# Patient Record
Sex: FEMALE | Race: WHITE | NOT HISPANIC OR LATINO | Employment: OTHER | URBAN - METROPOLITAN AREA
[De-identification: names, ages, dates, MRNs, and addresses within clinical notes are randomized per-mention and may not be internally consistent; named-entity substitution may affect disease eponyms.]

---

## 2023-06-08 ENCOUNTER — TELEPHONE (OUTPATIENT)
Dept: PRIMARY CARE | Facility: CLINIC | Age: 68
End: 2023-06-08
Payer: MEDICARE

## 2023-06-08 NOTE — TELEPHONE ENCOUNTER
Patient has a sinus infection and would like something called into her pharmacy [facility] in [place].

## 2023-07-31 ENCOUNTER — OFFICE VISIT (OUTPATIENT)
Dept: PRIMARY CARE | Facility: CLINIC | Age: 68
End: 2023-07-31
Payer: MEDICARE

## 2023-07-31 VITALS
DIASTOLIC BLOOD PRESSURE: 78 MMHG | HEART RATE: 96 BPM | WEIGHT: 176 LBS | BODY MASS INDEX: 28.41 KG/M2 | TEMPERATURE: 96.9 F | SYSTOLIC BLOOD PRESSURE: 134 MMHG

## 2023-07-31 DIAGNOSIS — E66.3 OVERWEIGHT WITH BODY MASS INDEX (BMI) OF 28 TO 28.9 IN ADULT: ICD-10-CM

## 2023-07-31 DIAGNOSIS — M79.18 PIRIFORMIS MUSCLE PAIN: Primary | ICD-10-CM

## 2023-07-31 DIAGNOSIS — M54.41 ACUTE RIGHT-SIDED LOW BACK PAIN WITH RIGHT-SIDED SCIATICA: ICD-10-CM

## 2023-07-31 PROBLEM — M54.50 LUMBAGO: Status: ACTIVE | Noted: 2023-07-31

## 2023-07-31 PROBLEM — E78.2 MIXED HYPERLIPIDEMIA: Status: ACTIVE | Noted: 2023-07-31

## 2023-07-31 PROBLEM — F41.1 GAD (GENERALIZED ANXIETY DISORDER): Status: ACTIVE | Noted: 2023-07-31

## 2023-07-31 PROBLEM — I10 HYPERTENSION: Status: ACTIVE | Noted: 2023-07-31

## 2023-07-31 PROCEDURE — 99213 OFFICE O/P EST LOW 20 MIN: CPT | Performed by: FAMILY MEDICINE

## 2023-07-31 PROCEDURE — 3008F BODY MASS INDEX DOCD: CPT | Performed by: FAMILY MEDICINE

## 2023-07-31 PROCEDURE — 1159F MED LIST DOCD IN RCRD: CPT | Performed by: FAMILY MEDICINE

## 2023-07-31 PROCEDURE — 3075F SYST BP GE 130 - 139MM HG: CPT | Performed by: FAMILY MEDICINE

## 2023-07-31 PROCEDURE — 1160F RVW MEDS BY RX/DR IN RCRD: CPT | Performed by: FAMILY MEDICINE

## 2023-07-31 PROCEDURE — 1036F TOBACCO NON-USER: CPT | Performed by: FAMILY MEDICINE

## 2023-07-31 PROCEDURE — 3078F DIAST BP <80 MM HG: CPT | Performed by: FAMILY MEDICINE

## 2023-07-31 RX ORDER — METHOCARBAMOL 500 MG/1
500 TABLET, FILM COATED ORAL 3 TIMES DAILY
COMMUNITY
Start: 2021-09-07 | End: 2023-12-19 | Stop reason: SDUPTHER

## 2023-07-31 RX ORDER — PAROXETINE 10 MG/1
10 TABLET, FILM COATED ORAL EVERY MORNING
COMMUNITY
Start: 2020-02-11 | End: 2023-11-26

## 2023-07-31 RX ORDER — NAPROXEN 500 MG/1
500 TABLET ORAL 2 TIMES DAILY PRN
Qty: 60 TABLET | Refills: 0 | Status: SHIPPED | OUTPATIENT
Start: 2023-07-31 | End: 2023-10-29

## 2023-07-31 RX ORDER — METHYLPREDNISOLONE 4 MG/1
TABLET ORAL
Qty: 21 TABLET | Refills: 0 | Status: SHIPPED | OUTPATIENT
Start: 2023-07-31 | End: 2023-08-07

## 2023-07-31 RX ORDER — ATORVASTATIN CALCIUM 20 MG/1
20 TABLET, FILM COATED ORAL
COMMUNITY
Start: 2020-11-30 | End: 2023-11-26

## 2023-07-31 RX ORDER — AMLODIPINE AND BENAZEPRIL HYDROCHLORIDE 5; 10 MG/1; MG/1
1 CAPSULE ORAL DAILY
COMMUNITY
Start: 2020-11-20 | End: 2023-11-26

## 2023-07-31 ASSESSMENT — PATIENT HEALTH QUESTIONNAIRE - PHQ9
1. LITTLE INTEREST OR PLEASURE IN DOING THINGS: NOT AT ALL
SUM OF ALL RESPONSES TO PHQ9 QUESTIONS 1 AND 2: 0
2. FEELING DOWN, DEPRESSED OR HOPELESS: NOT AT ALL

## 2023-07-31 NOTE — PROGRESS NOTES
Subjective   Patient ID: Bobbi Oliveira is a 67 y.o. female who presents for Back Pain (Pt is having sciatica pain on the right  lower side that radiates down to knee.for the last 3 weeks).  Very pleasant 67-year-old with back pain in the lower back radiating down to the leg especially painful in the hip  No injury or fall  Progressing over 3 weeks with no improvement  Waking her up at night  No incontinence no loss of feeling in the lower extremities    Back Pain  This is a new problem. The current episode started 1 to 4 weeks ago. The problem occurs daily. The problem has been gradually worsening since onset. The pain is present in the gluteal and sacro-iliac. The quality of the pain is described as aching, stabbing and shooting. The pain radiates to the right knee. The pain is moderate. The pain is Worse during the day. The symptoms are aggravated by bending, sitting and twisting. Stiffness is present In the morning. Pertinent negatives include no abdominal pain, bladder incontinence, bowel incontinence, chest pain, dysuria, fever, headaches, leg pain, numbness, paresis, paresthesias, pelvic pain, perianal numbness, tingling, weakness or weight loss. Risk factors include menopause. She has tried analgesics for the symptoms. The treatment provided no relief.       Review of Systems   Constitutional:  Negative for fever and weight loss.   Cardiovascular:  Negative for chest pain.   Gastrointestinal:  Negative for abdominal pain and bowel incontinence.   Genitourinary:  Negative for bladder incontinence, dysuria and pelvic pain.   Musculoskeletal:  Positive for back pain.   Neurological:  Negative for tingling, weakness, numbness, headaches and paresthesias.     Constitutional: no chills, no fever and no night sweats.   Eyes: no blurred vision and no eyesight problems.   ENT: no hearing loss, no nasal congestion, no nasal discharge, no hoarseness and no sore throat.   Cardiovascular: no chest pain, no  intermittent leg claudication, no lower extremity edema, no palpitations and no syncope.   Respiratory: no cough, no shortness of breath during exertion, no shortness of breath at rest and no wheezing.   Gastrointestinal: no abdominal pain, no blood in stools, no constipation, no diarrhea, no melena, no nausea, no rectal pain and no vomiting.   Genitourinary: no dysuria, no change in urinary frequency, no urinary hesitancy, no feelings of urinary urgency and no vaginal discharge.   Musculoskeletal: no arthralgias,  no back pain and no myalgias.   Integumentary: no new skin lesions and no rashes.   Neurological: no difficulty walking, no headache, no limb weakness, no numbness and no tingling.   Psychiatric: no anxiety, no depression, no anhedonia and no substance use disorders.   Endocrine: no recent weight gain and no recent weight loss.   Hematologic/Lymphatic: no tendency for easy bruising and no swollen glands .    Objective    /78   Pulse 96   Temp 36.1 °C (96.9 °F)   Wt 79.8 kg (176 lb)   BMI 28.41 kg/m²    Physical Exam  The patient appeared well nourished and normally developed. Vital signs as documented. Head exam is unremarkable. No scleral icterus or corneal arcus noted.  Pupils are equal round reactive to light extraocular movements are intact no hemorrhages noted on funduscopic exam mouth mucous membranes are moist no exudates ears canals clear TMs are gray pearly not injected nose no rhinorrhea or epistaxis Neck is without jugular venous distension, thyromegaly, or carotid bruits. Carotid upstrokes are brisk bilaterally. Lungs are clear to auscultation and percussion. Cardiac exam reveals the PMI to be normally sized and situated. Rhythm is regular. First and second heart sounds normal. No murmurs, rubs or gallops. Abdominal exam reveals normal bowel sounds, no masses, no organomegaly and no aortic enlargement. Extremities are nonedematous and both femoral and pedal pulses are normal.   Neurologic exam DTRs are equal bilaterally no focal deficits strength is symmetrical heme lymph no palpable lymph nodes in the neck axilla or groin pain in sciatica notch strength symmetrical     Assessment/Plan   Problem List Items Addressed This Visit       Lumbago     Physical therapy   Begin medrol dose pack         Piriformis muscle pain - Primary     Xray today  Begin physical therapy   Followup if no improvement         Relevant Medications    naproxen (Naprosyn) 500 mg tablet    Other Relevant Orders    XR hip right 2 or 3 views    Overweight with body mass index (BMI) of 28 to 28.9 in adult     Normal BMI nutrition and physical activity reviewed                 Corry Turk MD

## 2023-08-21 PROBLEM — H69.92 DYSFUNCTION OF LEFT EUSTACHIAN TUBE: Status: RESOLVED | Noted: 2023-08-21 | Resolved: 2023-08-21

## 2023-08-21 PROBLEM — R73.03 PREDIABETES: Status: ACTIVE | Noted: 2023-08-21

## 2023-08-21 PROBLEM — F40.243 ANXIETY WITH FLYING: Status: ACTIVE | Noted: 2023-08-21

## 2023-08-21 PROBLEM — E66.3 OVERWEIGHT WITH BODY MASS INDEX (BMI) OF 28 TO 28.9 IN ADULT: Status: ACTIVE | Noted: 2023-08-21

## 2023-08-21 PROBLEM — M79.18 PIRIFORMIS MUSCLE PAIN: Status: ACTIVE | Noted: 2023-08-21

## 2023-08-21 PROBLEM — R19.5 POSITIVE COLORECTAL CANCER SCREENING USING COLOGUARD TEST: Status: ACTIVE | Noted: 2023-08-21

## 2023-08-21 PROBLEM — G47.09 SECONDARY INSOMNIA: Status: ACTIVE | Noted: 2023-08-21

## 2023-08-21 PROBLEM — E55.9 VITAMIN D DEFICIENCY: Status: ACTIVE | Noted: 2023-08-21

## 2023-08-21 ASSESSMENT — ENCOUNTER SYMPTOMS
HEADACHES: 0
FEVER: 0
PARESTHESIAS: 0
TINGLING: 0
WEIGHT LOSS: 0
DYSURIA: 0
BOWEL INCONTINENCE: 0
PERIANAL NUMBNESS: 0
ABDOMINAL PAIN: 0
BACK PAIN: 1
WEAKNESS: 0
PARESIS: 0
LEG PAIN: 0
NUMBNESS: 0

## 2023-08-21 NOTE — PATIENT INSTRUCTIONS
Pain could be sciatica or piriformis syndrome, or due to hip arthritis  Xray today to evaluate and I recommend physical therapy to see if sympotms improve  Hopefully the anti-inflammatory medication and conservative physical therapy will help you improve  If you do not improve I will have you see a specialist to see if anything further can be done  Follow-up in a few weeks if no improvement  X-ray today  Continue healthy diet and exercise  Remember to schedule annual wellness exam for health maintenance

## 2023-09-11 ENCOUNTER — TELEPHONE (OUTPATIENT)
Dept: PRIMARY CARE | Facility: CLINIC | Age: 68
End: 2023-09-11
Payer: MEDICARE

## 2023-09-15 ENCOUNTER — TELEPHONE (OUTPATIENT)
Dept: PRIMARY CARE | Facility: CLINIC | Age: 68
End: 2023-09-15
Payer: MEDICARE

## 2023-09-15 NOTE — TELEPHONE ENCOUNTER
[facility] physical therapy is waiting for Dr. Turk to sign paper work that was sent electronically and faxed over.

## 2023-09-17 DIAGNOSIS — M79.18 PIRIFORMIS MUSCLE PAIN: Primary | ICD-10-CM

## 2023-09-17 DIAGNOSIS — M19.91 PRIMARY OSTEOARTHRITIS, UNSPECIFIED SITE: ICD-10-CM

## 2023-09-17 RX ORDER — DICLOFENAC SODIUM 75 MG/1
75 TABLET, DELAYED RELEASE ORAL 2 TIMES DAILY PRN
Qty: 180 TABLET | Refills: 3 | Status: SHIPPED | OUTPATIENT
Start: 2023-09-17 | End: 2024-09-16

## 2023-09-17 NOTE — PROGRESS NOTES
Patient sent message asking for medication for hip arthritis  Sent diclofenac  Consider hip fluoroscopy injection steroid if no improvement

## 2023-10-10 ENCOUNTER — OFFICE VISIT (OUTPATIENT)
Dept: ORTHOPEDIC SURGERY | Facility: CLINIC | Age: 68
End: 2023-10-10
Payer: MEDICARE

## 2023-10-10 VITALS — BODY MASS INDEX: 26.66 KG/M2 | WEIGHT: 160 LBS | HEIGHT: 65 IN

## 2023-10-10 DIAGNOSIS — M16.11 ARTHRITIS OF RIGHT HIP: Primary | ICD-10-CM

## 2023-10-10 DIAGNOSIS — M25.551 RIGHT HIP PAIN: ICD-10-CM

## 2023-10-10 PROCEDURE — 1160F RVW MEDS BY RX/DR IN RCRD: CPT | Performed by: FAMILY MEDICINE

## 2023-10-10 PROCEDURE — 99203 OFFICE O/P NEW LOW 30 MIN: CPT | Performed by: FAMILY MEDICINE

## 2023-10-10 PROCEDURE — 1159F MED LIST DOCD IN RCRD: CPT | Performed by: FAMILY MEDICINE

## 2023-10-10 PROCEDURE — 3008F BODY MASS INDEX DOCD: CPT | Performed by: FAMILY MEDICINE

## 2023-10-10 PROCEDURE — 20611 DRAIN/INJ JOINT/BURSA W/US: CPT | Performed by: FAMILY MEDICINE

## 2023-10-10 PROCEDURE — 1036F TOBACCO NON-USER: CPT | Performed by: FAMILY MEDICINE

## 2023-10-10 RX ORDER — LIDOCAINE HYDROCHLORIDE 10 MG/ML
2 INJECTION INFILTRATION; PERINEURAL
Status: COMPLETED | OUTPATIENT
Start: 2023-10-10 | End: 2023-10-10

## 2023-10-10 RX ORDER — TRIAMCINOLONE ACETONIDE 40 MG/ML
40 INJECTION, SUSPENSION INTRA-ARTICULAR; INTRAMUSCULAR
Status: COMPLETED | OUTPATIENT
Start: 2023-10-10 | End: 2023-10-10

## 2023-10-10 RX ADMIN — LIDOCAINE HYDROCHLORIDE 2 ML: 10 INJECTION INFILTRATION; PERINEURAL at 10:10

## 2023-10-10 RX ADMIN — TRIAMCINOLONE ACETONIDE 40 MG: 40 INJECTION, SUSPENSION INTRA-ARTICULAR; INTRAMUSCULAR at 10:10

## 2023-10-10 NOTE — PROGRESS NOTES
History of Present Illness   Chief Complaint   Patient presents with    Right Hip - Pain       The patient is 67 y.o. female  here with a complaint of right hip pain.  Onset of symptoms over the past 3 months, atraumatic in nature.  Patient did see her primary care physician near onset of symptoms, she had x-rays of her hip which showed some mild to moderate degenerative changes, there was some concern for potential sciatic component to her symptoms as she was having some radiating pain down her leg to her knee.  She was started on some prednisone and referred to physical therapy.  She has seen some improvement in symptoms overall but is still having discomfort in her hip most notable with prolonged standing, walking.  She points to the anterior aspect of her hip in her groin as area of discomfort, she also admits to some stabbing pain in the posterior hip, still with some radiation of pain down her leg but less prominent than before.  She has been doing physical therapy through [facility] with some improvement, she was given a heel lift for left as there was thought to be a mild leg length discrepancy.  Patient did see an orthopedic surgeon through [facility], was told that her arthritis was mild and that she was not a candidate for surgery, she is looking for alternative opinion on neck steps in treatment.  She denies any significant back pain, she denies any lower extremity numbness or tingling.  She has tried prescription naproxen and diclofenac from her PCP which does seem to help her pain but says that it does not make her feel all that good including some GI sensitivity.    Past Medical History:   Diagnosis Date    Acute frontal sinusitis, unspecified 05/22/2020    Sinusitis, acute frontal    Acute maxillary sinusitis, unspecified 05/22/2020    Sinusitis, acute maxillary    Dysfunction of left eustachian tube 08/21/2023    Generalized anxiety disorder 01/10/2020    Anxiety, generalized    Nervousness  01/10/2020    Nervously anxious    Personal history of other endocrine, nutritional and metabolic disease 05/03/2021    History of hyperlipidemia    Personal history of other infectious and parasitic diseases 02/09/2018    History of herpes zoster    Personal history of other medical treatment 04/13/2021    History of screening mammography       Medication Documentation Review Audit       Reviewed by Corry Turk MD (Physician) on 08/21/23 at 0025      Medication Order Taking? Sig Documenting Provider Last Dose Status   amLODIPine-benazepriL (Lotrel) 5-10 mg capsule [id number] Yes Take 1 capsule by mouth once daily. Historical Provider, MD  Active   atorvastatin (Lipitor) 20 mg tablet [id number] Yes 1 tablet (20 mg). Historical Provider, MD  Active   methocarbamol (Robaxin) 500 mg tablet [id number] Yes Take 1 tablet (500 mg) by mouth 3 times a day. Historical Provider, MD  Active   naproxen (Naprosyn) 500 mg tablet [id number]  Take 1 tablet (500 mg) by mouth 2 times a day as needed for mild pain (1 - 3) (pain). Corry Turk MD  Active   PARoxetine (Paxil) 10 mg tablet [id number] Yes Take 1 tablet (10 mg) by mouth once daily in the morning. Historical Provider, MD  Active                    No Known Allergies    Social History     Socioeconomic History    Marital status: Unknown     Spouse name: Not on file    Number of children: Not on file    Years of education: Not on file    Highest education level: Not on file   Occupational History    Not on file   Tobacco Use    Smoking status: Never    Smokeless tobacco: Never   Substance and Sexual Activity    Alcohol use: Yes    Drug use: Never    Sexual activity: Not on file   Other Topics Concern    Not on file   Social History Narrative    Not on file     Social Determinants of Health     Financial Resource Strain: Not on file   Food Insecurity: Not on file   Transportation Needs: Not on file   Physical Activity: Not on file   Stress: Not on file   Social Connections: Not  on file   Intimate Partner Violence: Not on file   Housing Stability: Not on file       Past Surgical History:   Procedure Laterality Date    DILATION AND CURETTAGE OF UTERUS  02/09/2018    Dilation And Curettage    OTHER SURGICAL HISTORY  11/20/2020    Dilation and curettage          Review of Systems   GENERAL: Negative  GI: Negative  MUSCULOSKELETAL: See HPI  SKIN: Negative  NEURO:  Negative     Physical Exam:    General/Constitutional: well appearing, no distress, appears stated age  HEENT: sclera clear  Respiratory: non labored breathing  Vascular: No edema, swelling or tenderness, except as noted in detailed exam.  Integumentary: No impressive skin lesions present, except as noted in detailed exam.  Neurological:  Alert and oriented   Psychological:  Normal mood and affect.  Musculoskeletal: Normal, except as noted in detailed exam and in HPI.  Normal gait, unassisted    Right hip: Normal appearance, no significant length discrepancy rotational deformity.  There are some mild tenderness palpation at the SI joint posteriorly, no anterior or lateral hip tenderness palpation.  Range of motion with flexion to 100 degrees with pain, internal rotation 20 degrees with exacerbation of anterior hip/groin pain.  No significant motor deficits are present at the hip.  She does have a positive Stinchfield test.    Lumbar spine exam reveals right-sided SI joint tenderness, no lower lumbar paraspinal or midline tenderness.  She has good range of motion with flexion, extension, bilateral twisting and side bending without pain, no lower extremity numbness or weakness, no pathologic lower extremity reflexes bilaterally.      L Inj/Asp: R hip joint on 10/10/2023 10:10 AM  Indications: pain  Details: 22 G needle, ultrasound-guided anterior approach  Medications: 40 mg triamcinolone acetonide 40 mg/mL; 2 mL lidocaine 10 mg/mL (1 %)  Outcome: tolerated well, no immediate complications  Procedure, treatment alternatives, risks and  benefits explained, specific risks discussed. Consent was given by the patient. Immediately prior to procedure a time out was called to verify the correct patient, procedure, equipment, support staff and site/side marked as required. Patient was prepped and draped in the usual sterile fashion.                Imaging  X-rays of right hip from 8/8/2023 independently reviewed, there is some mild to moderate degenerative changes of the right hip, there is mild joint space narrowing, there is osteophytosis at the superior aspect of the acetabulum     Assessment   1. Arthritis of right hip        2. Right hip pain  Point of Care Ultrasound    L Inj/Asp: R hip joint        Right hip pain, history, exam findings do suggest intra-articular component to her symptoms, x-rays show some mild to moderate degenerative changes.  Ongoing symptoms despite conservative management including prescription NSAIDs and physical therapy    Plan  Discussed diagnosis, reviewed x-rays, further work-up and treatment.  We did opt to proceed with ultrasound-guided right hip joint injection with Kenalog for both diagnostic and therapeutic purposes.  She will continue with physical therapy, hopefully she can wean off of prescription NSAIDs pending response to injection.  I would like to see her back in 4 weeks for reassessment.

## 2023-10-13 ENCOUNTER — HOSPITAL ENCOUNTER (OUTPATIENT)
Dept: RADIOLOGY | Facility: EXTERNAL LOCATION | Age: 68
Discharge: HOME | End: 2023-10-13
Payer: MEDICARE

## 2023-10-17 ENCOUNTER — APPOINTMENT (OUTPATIENT)
Dept: ORTHOPEDIC SURGERY | Facility: CLINIC | Age: 68
End: 2023-10-17
Payer: MEDICARE

## 2023-11-06 PROBLEM — E78.1 HYPERTRIGLYCERIDEMIA: Status: ACTIVE | Noted: 2023-11-06

## 2023-11-06 PROBLEM — N63.0 MASS OF BREAST: Status: ACTIVE | Noted: 2023-11-06

## 2023-11-06 PROBLEM — D72.829 LEUKOCYTOSIS: Status: ACTIVE | Noted: 2023-11-06

## 2023-11-06 PROBLEM — N95.2 VAGINAL ATROPHY: Status: ACTIVE | Noted: 2023-11-06

## 2023-11-06 RX ORDER — HYDROXYZINE HYDROCHLORIDE 25 MG/1
1 TABLET, FILM COATED ORAL DAILY PRN
COMMUNITY
End: 2023-12-19 | Stop reason: SDUPTHER

## 2023-11-06 RX ORDER — FENOFIBRATE 145 MG/1
145 TABLET, FILM COATED ORAL DAILY
COMMUNITY

## 2023-11-06 RX ORDER — ALPRAZOLAM 0.5 MG/1
0.5 TABLET ORAL DAILY PRN
COMMUNITY

## 2023-11-07 ENCOUNTER — OFFICE VISIT (OUTPATIENT)
Dept: ORTHOPEDIC SURGERY | Facility: CLINIC | Age: 68
End: 2023-11-07
Payer: MEDICARE

## 2023-11-07 VITALS — BODY MASS INDEX: 26.66 KG/M2 | WEIGHT: 160 LBS | HEIGHT: 65 IN

## 2023-11-07 DIAGNOSIS — M16.11 ARTHRITIS OF RIGHT HIP: Primary | ICD-10-CM

## 2023-11-07 DIAGNOSIS — Z86.59 HISTORY OF CLAUSTROPHOBIA: ICD-10-CM

## 2023-11-07 DIAGNOSIS — M25.551 PAIN OF RIGHT HIP: ICD-10-CM

## 2023-11-07 PROCEDURE — 1159F MED LIST DOCD IN RCRD: CPT | Performed by: FAMILY MEDICINE

## 2023-11-07 PROCEDURE — 3078F DIAST BP <80 MM HG: CPT | Performed by: FAMILY MEDICINE

## 2023-11-07 PROCEDURE — 3008F BODY MASS INDEX DOCD: CPT | Performed by: FAMILY MEDICINE

## 2023-11-07 PROCEDURE — 99214 OFFICE O/P EST MOD 30 MIN: CPT | Performed by: FAMILY MEDICINE

## 2023-11-07 PROCEDURE — 1160F RVW MEDS BY RX/DR IN RCRD: CPT | Performed by: FAMILY MEDICINE

## 2023-11-07 PROCEDURE — 1036F TOBACCO NON-USER: CPT | Performed by: FAMILY MEDICINE

## 2023-11-07 PROCEDURE — 3075F SYST BP GE 130 - 139MM HG: CPT | Performed by: FAMILY MEDICINE

## 2023-11-07 RX ORDER — LORAZEPAM 1 MG/1
1 TABLET ORAL SEE ADMIN INSTRUCTIONS
Qty: 1 TABLET | Refills: 0 | Status: SHIPPED | OUTPATIENT
Start: 2023-11-07

## 2023-11-07 RX ORDER — MELOXICAM 15 MG/1
15 TABLET ORAL DAILY
Qty: 30 TABLET | Refills: 0 | Status: SHIPPED | OUTPATIENT
Start: 2023-11-07 | End: 2023-12-07

## 2023-11-07 NOTE — PROGRESS NOTES
History of Present Illness   Chief Complaint   Patient presents with    Right Hip - Follow-up       The patient is 67 y.o. female  here for follow-up of right hip pain.  Patient was seen by me 1 month ago, onset of symptoms approximately 4 months ago.  See previous note for full details.  In brief patient has been dealing with some atraumatic pain in her right hip including the anterior hip and groin with some radiation of pain down her leg to her knee.  Treatment prior to seeing me was conservative with some prescription anti-inflammatories including prednisone as well as diclofenac and formal physical therapy, minimal change in symptoms, she did have x-rays which showed some mild to moderate degenerative changes, she did see outside orthopedic surgeon who recommended against surgical intervention.  At our initial visit 1 month ago symptoms did seem to be coming from the hip joint, she was treated with ultrasound-guided hip joint injection with Kenalog.  She says that for around 10 days she had around 80% improvement in her symptoms but has had recurrence of pain.  She admits to ongoing discomfort in the anterior hip and groin, still having some pain down her thigh, slight more prominent laterally to her knee, occasional pain past her knee.  She has pain most notable with prolonged standing, walking      Past Medical History:   Diagnosis Date    Acute frontal sinusitis, unspecified 05/22/2020    Sinusitis, acute frontal    Acute maxillary sinusitis, unspecified 05/22/2020    Sinusitis, acute maxillary    Dysfunction of left eustachian tube 08/21/2023    Generalized anxiety disorder 01/10/2020    Anxiety, generalized    Nervousness 01/10/2020    Nervously anxious    Personal history of other endocrine, nutritional and metabolic disease 05/03/2021    History of hyperlipidemia    Personal history of other infectious and parasitic diseases 02/09/2018    History of herpes zoster    Personal history of other medical  treatment 04/13/2021    History of screening mammography       Medication Documentation Review Audit       Reviewed by Corry Turk MD (Physician) on 08/21/23 at 0025      Medication Order Taking? Sig Documenting Provider Last Dose Status   amLODIPine-benazepriL (Lotrel) 5-10 mg capsule [id number] Yes Take 1 capsule by mouth once daily. Historical Provider, MD  Active   atorvastatin (Lipitor) 20 mg tablet [id number] Yes 1 tablet (20 mg). Historical Provider, MD  Active   methocarbamol (Robaxin) 500 mg tablet [id number] Yes Take 1 tablet (500 mg) by mouth 3 times a day. Historical Provider, MD  Active   naproxen (Naprosyn) 500 mg tablet [id number]  Take 1 tablet (500 mg) by mouth 2 times a day as needed for mild pain (1 - 3) (pain). Corry Turk MD  Active   PARoxetine (Paxil) 10 mg tablet [id number] Yes Take 1 tablet (10 mg) by mouth once daily in the morning. Historical Provider, MD  Active                    Allergies   Allergen Reactions    Aspirin Agitation and Other     bruising    Increase bruising per pt    bruising    Increase bruising per pt       Social History     Socioeconomic History    Marital status: Unknown     Spouse name: Not on file    Number of children: Not on file    Years of education: Not on file    Highest education level: Not on file   Occupational History    Not on file   Tobacco Use    Smoking status: Never    Smokeless tobacco: Never   Substance and Sexual Activity    Alcohol use: Yes    Drug use: Never    Sexual activity: Not on file   Other Topics Concern    Not on file   Social History Narrative    Not on file     Social Determinants of Health     Financial Resource Strain: Not on file   Food Insecurity: Not on file   Transportation Needs: Not on file   Physical Activity: Not on file   Stress: Not on file   Social Connections: Not on file   Intimate Partner Violence: Not on file   Housing Stability: Not on file       Past Surgical History:   Procedure Laterality Date    DILATION AND  CURETTAGE OF UTERUS  02/09/2018    Dilation And Curettage    OTHER SURGICAL HISTORY  11/20/2020    Dilation and curettage          Review of Systems   GENERAL: Negative  GI: Negative  MUSCULOSKELETAL: See HPI  SKIN: Negative  NEURO:  Negative     Physical Exam:    General/Constitutional: well appearing, no distress, appears stated age  HEENT: sclera clear  Respiratory: non labored breathing  Vascular: No edema, swelling or tenderness, except as noted in detailed exam.  Integumentary: No impressive skin lesions present, except as noted in detailed exam.  Neurological:  Alert and oriented   Psychological:  Normal mood and affect.  Musculoskeletal: Normal, except as noted in detailed exam and in HPI.  Antalgic gait, unassisted    Right hip: Normal appearance, no significant length discrepancy rotational deformity.  There are some mild tenderness palpation at the SI joint posteriorly, no anterior or lateral hip tenderness palpation.  Range of motion with flexion to 100 degrees with pain, internal rotation 20 degrees with exacerbation of anterior hip/groin pain.  No significant motor deficits are present at the hip.  She does have a positive Stinchfield test.    Lumbar spine exam reveals right-sided SI joint tenderness, no lower lumbar paraspinal or midline tenderness.  She has good range of motion with flexion, extension, bilateral twisting and side bending without pain.  Negative straight leg raise test, negative slump test bilaterally, no lower extremity numbness or weakness, no pathologic lower extremity reflexes bilaterally.           Imaging  No new imaging today     Assessment   1. Pain of right hip  MR hip right wo IV contrast    meloxicam (Mobic) 15 mg tablet      2. History of claustrophobia  LORazepam (Ativan) 1 mg tablet        Right hip/lower extremity pain, based on exam findings, good but short-term response to intra-articular hip joint injection I do think majority of pain is likely stemming from  arthritis though x-rays do only show some more mild degenerative changes.  We discussed potential radicular component to her symptoms but seems less likely, does have some SI joint pain which may be compensatory from her hip joint    Plan  Discussed further work-up and treatment with patient.  I would like to obtain an MRI of her right hip for further evaluation of her hip joint given only or mild degenerative changes seen on x-ray for better evaluation of any additional arthritic changes seen in her hip, other potential causes of her hip pain.  We discussed possible evaluation by medical spine, could consider if minimal degenerative changes seen on x-ray, we also discussed role of possible PRP injection for hip joint as well as more definitive management with hip replacement surgery.  She was given a prescription for meloxicam to assist in pain control, she will stop all other NSAIDs.  Further treatment pending MRI results..

## 2023-11-18 DIAGNOSIS — I10 HYPERTENSION: Primary | ICD-10-CM

## 2023-11-26 RX ORDER — PAROXETINE 10 MG/1
10 TABLET, FILM COATED ORAL DAILY
Qty: 90 TABLET | Refills: 3 | Status: SHIPPED | OUTPATIENT
Start: 2023-11-26

## 2023-11-26 RX ORDER — ATORVASTATIN CALCIUM 20 MG/1
20 TABLET, FILM COATED ORAL NIGHTLY
Qty: 90 TABLET | Refills: 3 | Status: SHIPPED | OUTPATIENT
Start: 2023-11-26

## 2023-11-26 RX ORDER — AMLODIPINE AND BENAZEPRIL HYDROCHLORIDE 5; 10 MG/1; MG/1
1 CAPSULE ORAL DAILY
Qty: 90 CAPSULE | Refills: 3 | Status: SHIPPED | OUTPATIENT
Start: 2023-11-26

## 2023-11-27 ENCOUNTER — HOSPITAL ENCOUNTER (OUTPATIENT)
Dept: RADIOLOGY | Facility: CLINIC | Age: 68
Discharge: HOME | End: 2023-11-27
Payer: MEDICARE

## 2023-11-27 DIAGNOSIS — M25.551 PAIN OF RIGHT HIP: ICD-10-CM

## 2023-11-27 PROCEDURE — 73721 MRI JNT OF LWR EXTRE W/O DYE: CPT | Mod: RT

## 2023-11-27 PROCEDURE — 73721 MRI JNT OF LWR EXTRE W/O DYE: CPT | Mod: RIGHT SIDE | Performed by: RADIOLOGY

## 2023-11-27 PROCEDURE — 72195 MRI PELVIS W/O DYE: CPT | Mod: RIGHT SIDE | Performed by: RADIOLOGY

## 2023-12-19 ENCOUNTER — OFFICE VISIT (OUTPATIENT)
Dept: PRIMARY CARE | Facility: CLINIC | Age: 68
End: 2023-12-19
Payer: MEDICARE

## 2023-12-19 VITALS
HEIGHT: 65 IN | BODY MASS INDEX: 28.92 KG/M2 | DIASTOLIC BLOOD PRESSURE: 82 MMHG | TEMPERATURE: 99 F | SYSTOLIC BLOOD PRESSURE: 137 MMHG | HEART RATE: 100 BPM | WEIGHT: 173.6 LBS

## 2023-12-19 DIAGNOSIS — Z12.31 ENCOUNTER FOR SCREENING MAMMOGRAM FOR BREAST CANCER: ICD-10-CM

## 2023-12-19 DIAGNOSIS — F40.243 ANXIETY WITH FLYING: ICD-10-CM

## 2023-12-19 DIAGNOSIS — M15.9 PRIMARY OSTEOARTHRITIS INVOLVING MULTIPLE JOINTS: ICD-10-CM

## 2023-12-19 DIAGNOSIS — M54.41 ACUTE RIGHT-SIDED LOW BACK PAIN WITH RIGHT-SIDED SCIATICA: ICD-10-CM

## 2023-12-19 DIAGNOSIS — Z11.59 NEED FOR HEPATITIS C SCREENING TEST: ICD-10-CM

## 2023-12-19 DIAGNOSIS — R73.03 PREDIABETES: ICD-10-CM

## 2023-12-19 DIAGNOSIS — E78.2 MIXED HYPERLIPIDEMIA: ICD-10-CM

## 2023-12-19 DIAGNOSIS — E66.3 OVERWEIGHT WITH BODY MASS INDEX (BMI) OF 28 TO 28.9 IN ADULT: ICD-10-CM

## 2023-12-19 DIAGNOSIS — Z00.00 MEDICARE ANNUAL WELLNESS VISIT, SUBSEQUENT: Primary | ICD-10-CM

## 2023-12-19 DIAGNOSIS — Z78.0 POSTMENOPAUSAL STATE: ICD-10-CM

## 2023-12-19 PROBLEM — E78.1 HYPERTRIGLYCERIDEMIA: Status: RESOLVED | Noted: 2023-11-06 | Resolved: 2023-12-19

## 2023-12-19 PROCEDURE — 3008F BODY MASS INDEX DOCD: CPT | Performed by: FAMILY MEDICINE

## 2023-12-19 PROCEDURE — G0009 ADMIN PNEUMOCOCCAL VACCINE: HCPCS | Performed by: FAMILY MEDICINE

## 2023-12-19 PROCEDURE — 90677 PCV20 VACCINE IM: CPT | Performed by: FAMILY MEDICINE

## 2023-12-19 PROCEDURE — G0439 PPPS, SUBSEQ VISIT: HCPCS | Performed by: FAMILY MEDICINE

## 2023-12-19 PROCEDURE — 1036F TOBACCO NON-USER: CPT | Performed by: FAMILY MEDICINE

## 2023-12-19 PROCEDURE — 3075F SYST BP GE 130 - 139MM HG: CPT | Performed by: FAMILY MEDICINE

## 2023-12-19 PROCEDURE — 1170F FXNL STATUS ASSESSED: CPT | Performed by: FAMILY MEDICINE

## 2023-12-19 PROCEDURE — 99212 OFFICE O/P EST SF 10 MIN: CPT | Performed by: FAMILY MEDICINE

## 2023-12-19 PROCEDURE — 3079F DIAST BP 80-89 MM HG: CPT | Performed by: FAMILY MEDICINE

## 2023-12-19 PROCEDURE — 1159F MED LIST DOCD IN RCRD: CPT | Performed by: FAMILY MEDICINE

## 2023-12-19 RX ORDER — MELOXICAM 7.5 MG/1
7.5 TABLET ORAL DAILY
Qty: 30 TABLET | Refills: 11 | Status: SHIPPED | OUTPATIENT
Start: 2023-12-19 | End: 2024-12-18

## 2023-12-19 RX ORDER — HYDROXYZINE HYDROCHLORIDE 25 MG/1
25 TABLET, FILM COATED ORAL DAILY PRN
Qty: 90 TABLET | Refills: 3 | Status: SHIPPED | OUTPATIENT
Start: 2023-12-19

## 2023-12-19 RX ORDER — METHOCARBAMOL 500 MG/1
500 TABLET, FILM COATED ORAL 3 TIMES DAILY
Qty: 90 TABLET | Refills: 3 | Status: SHIPPED | OUTPATIENT
Start: 2023-12-19

## 2023-12-19 ASSESSMENT — PATIENT HEALTH QUESTIONNAIRE - PHQ9
SUM OF ALL RESPONSES TO PHQ9 QUESTIONS 1 AND 2: 0
2. FEELING DOWN, DEPRESSED OR HOPELESS: NOT AT ALL
1. LITTLE INTEREST OR PLEASURE IN DOING THINGS: NOT AT ALL

## 2023-12-19 ASSESSMENT — ACTIVITIES OF DAILY LIVING (ADL)
DRESSING: INDEPENDENT
DOING_HOUSEWORK: INDEPENDENT
GROCERY_SHOPPING: INDEPENDENT
TAKING_MEDICATION: INDEPENDENT
MANAGING_FINANCES: INDEPENDENT
BATHING: INDEPENDENT

## 2023-12-19 NOTE — PROGRESS NOTES
Subjective   Reason for Visit: Bobbi Oliveira is an 68 y.o. female here for a Medicare Wellness visit.     Past Medical, Surgical, and Family History reviewed and updated in chart.    Reviewed all medications by prescribing practitioner or clinical pharmacist (such as prescriptions, OTCs, herbal therapies and supplements) and documented in the medical record.    Very pleasant 68-year-old here for annual Medicare wellness exam  Low back pain and hip pain  Underwent physical therapy  Still having discomfort has had for several weeks  No incontinence  Has pain low back aggravated by sitting has been going on for several weeks no acute injury  Has history of situational anxiety would like refill of hydroxyzine works well with no side effects or issues  Also complaining of joint pain  Has age-related arthritis  Starting to interfere with function  Has been progressive over several months        Patient Self Assessment of Health Status  Patient Self Assessment: Good    Nutrition and Exercise  Current Diet: Well Balanced Diet  Adequate Fluid Intake: Yes  Caffeine: Yes  Exercise Frequency: Regularly    Functional Ability/Level of Safety  Cognitive Impairment Observed: No cognitive impairment observed    Home Safety Risk Factors: None         Patient Care Team:  Corry Turk MD as PCP - General  Corry Turk MD as PCP - [facility] Attributed Provider     Review of Systems  Constitutional: no chills, no fever and no night sweats.   Eyes: no blurred vision and no eyesight problems.   ENT: no hearing loss, no nasal congestion, no nasal discharge, no hoarseness and no sore throat.   Cardiovascular: no chest pain, no intermittent leg claudication, no lower extremity edema, no palpitations and no syncope.   Respiratory: no cough, no shortness of breath during exertion, no shortness of breath at rest and no wheezing.   Gastrointestinal: no abdominal pain, no blood in stools, no constipation, no diarrhea, no melena, no  "nausea, no rectal pain and no vomiting.   Genitourinary: no dysuria, no change in urinary frequency, no urinary hesitancy, no feelings of urinary urgency and no vaginal discharge.   Musculoskeletal: no arthralgias,  no back pain and no myalgias.   Integumentary: no new skin lesions and no rashes.   Neurological: no difficulty walking, no headache, no limb weakness, no numbness and no tingling.   Psychiatric: no anxiety, no depression, no anhedonia and no substance use disorders.   Endocrine: no recent weight gain and no recent weight loss.   Hematologic/Lymphatic: no tendency for easy bruising and no swollen glands .    Medicare Wellness Visit Billing Compliance Not Met    *This is a visual tool to show completion of required items on the day of the visit. Green checks will only appear on the date of visit.      Objective   Vitals:  /82   Pulse 100   Temp 37.2 °C (99 °F)   Ht 1.651 m (5' 5\")   Wt 78.7 kg (173 lb 9.6 oz)   BMI 28.89 kg/m²       Physical Exam  The patient appeared well nourished and normally developed. Vital signs as documented. Head exam is unremarkable. No scleral icterus or corneal arcus noted.  Pupils are equal round reactive to light extraocular movements are intact no hemorrhages noted on funduscopic exam mouth mucous membranes are moist no exudates ears canals clear TMs are gray pearly not injected nose no rhinorrhea or epistaxis Neck is without jugular venous distension, thyromegaly, or carotid bruits. Carotid upstrokes are brisk bilaterally. Lungs are clear to auscultation and percussion. Cardiac exam reveals the PMI to be normally sized and situated. Rhythm is regular. First and second heart sounds normal. No murmurs, rubs or gallops. Abdominal exam reveals normal bowel sounds, no masses, no organomegaly and no aortic enlargement. Extremities are nonedematous and both femoral and pedal pulses are normal.  Neurologic exam DTRs are equal bilaterally no focal deficits strength is " symmetrical heme lymph no palpable lymph nodes in the neck axilla or groin    Assessment/Plan   Problem List Items Addressed This Visit       Mixed hyperlipidemia    Lumbago    Current Assessment & Plan     Most likely due to degenerative arthritis  Robaxin and meloxicam  Regular physical exercise physical therapy  Follow-up if no improvement         Relevant Medications    methocarbamol (Robaxin) 500 mg tablet    Anxiety with flying    Current Assessment & Plan     Hydroxyzine as needed         Relevant Medications    hydrOXYzine HCL (Atarax) 25 mg tablet    Prediabetes    Relevant Orders    Hemoglobin A1C    Overweight with body mass index (BMI) of 28 to 28.9 in adult    Current Assessment & Plan     Above normal BMI nutrition and physical activity reviewed         Medicare annual wellness visit, subsequent - Primary    Current Assessment & Plan     Unremarkable physical exam  Continue annual exams         Primary osteoarthritis involving multiple joints    Current Assessment & Plan     Age-related degenerative arthritis  Meloxicam  Follow-up yearly or if no improvement  Regular physical exercise warm water exercise classes help         Relevant Medications    meloxicam (Mobic) 7.5 mg tablet     Other Visit Diagnoses       Need for hepatitis C screening test        Relevant Orders    Hepatitis C antibody    Postmenopausal state        Relevant Orders    XR DEXA bone density    Encounter for screening mammogram for breast cancer        Relevant Orders    BI mammo bilateral screening tomosynthesis

## 2024-01-01 PROBLEM — M15.9 PRIMARY OSTEOARTHRITIS INVOLVING MULTIPLE JOINTS: Status: ACTIVE | Noted: 2024-01-01

## 2024-01-01 PROBLEM — Z00.00 MEDICARE ANNUAL WELLNESS VISIT, SUBSEQUENT: Status: ACTIVE | Noted: 2024-01-01

## 2024-01-01 PROBLEM — M15.0 PRIMARY OSTEOARTHRITIS INVOLVING MULTIPLE JOINTS: Status: ACTIVE | Noted: 2024-01-01

## 2024-01-01 NOTE — ASSESSMENT & PLAN NOTE
Most likely due to degenerative arthritis  Robaxin and meloxicam  Regular physical exercise physical therapy  Follow-up if no improvement

## 2024-01-01 NOTE — ASSESSMENT & PLAN NOTE
Age-related degenerative arthritis  Meloxicam  Follow-up yearly or if no improvement  Regular physical exercise warm water exercise classes help

## 2024-01-11 ENCOUNTER — OFFICE VISIT (OUTPATIENT)
Dept: OTOLARYNGOLOGY | Facility: CLINIC | Age: 69
End: 2024-01-11
Payer: MEDICARE

## 2024-01-11 ENCOUNTER — CLINICAL SUPPORT (OUTPATIENT)
Dept: AUDIOLOGY | Facility: CLINIC | Age: 69
End: 2024-01-11
Payer: MEDICARE

## 2024-01-11 VITALS
TEMPERATURE: 98.8 F | SYSTOLIC BLOOD PRESSURE: 134 MMHG | HEIGHT: 65 IN | HEART RATE: 114 BPM | WEIGHT: 175 LBS | DIASTOLIC BLOOD PRESSURE: 86 MMHG | BODY MASS INDEX: 29.16 KG/M2

## 2024-01-11 DIAGNOSIS — H90.3 SENSORINEURAL HEARING LOSS (SNHL) OF BOTH EARS: Primary | ICD-10-CM

## 2024-01-11 DIAGNOSIS — H90.3 SENSORINEURAL HEARING LOSS (SNHL) OF BOTH EARS: ICD-10-CM

## 2024-01-11 DIAGNOSIS — R42 VERTIGO: Primary | ICD-10-CM

## 2024-01-11 DIAGNOSIS — H81.10 BENIGN PAROXYSMAL POSITIONAL VERTIGO, UNSPECIFIED LATERALITY: ICD-10-CM

## 2024-01-11 DIAGNOSIS — R42 VERTIGO: ICD-10-CM

## 2024-01-11 PROCEDURE — 99214 OFFICE O/P EST MOD 30 MIN: CPT | Performed by: NURSE PRACTITIONER

## 2024-01-11 PROCEDURE — 1036F TOBACCO NON-USER: CPT | Performed by: NURSE PRACTITIONER

## 2024-01-11 PROCEDURE — 99204 OFFICE O/P NEW MOD 45 MIN: CPT | Performed by: NURSE PRACTITIONER

## 2024-01-11 PROCEDURE — 3079F DIAST BP 80-89 MM HG: CPT | Performed by: NURSE PRACTITIONER

## 2024-01-11 PROCEDURE — 1126F AMNT PAIN NOTED NONE PRSNT: CPT | Performed by: NURSE PRACTITIONER

## 2024-01-11 PROCEDURE — 3008F BODY MASS INDEX DOCD: CPT | Performed by: NURSE PRACTITIONER

## 2024-01-11 PROCEDURE — 1160F RVW MEDS BY RX/DR IN RCRD: CPT | Performed by: NURSE PRACTITIONER

## 2024-01-11 PROCEDURE — 3075F SYST BP GE 130 - 139MM HG: CPT | Performed by: NURSE PRACTITIONER

## 2024-01-11 PROCEDURE — 1159F MED LIST DOCD IN RCRD: CPT | Performed by: NURSE PRACTITIONER

## 2024-01-11 PROCEDURE — 92550 TYMPANOMETRY & REFLEX THRESH: CPT | Performed by: AUDIOLOGIST

## 2024-01-11 PROCEDURE — 92557 COMPREHENSIVE HEARING TEST: CPT | Performed by: AUDIOLOGIST

## 2024-01-11 RX ORDER — MECLIZINE HYDROCHLORIDE 25 MG/1
TABLET ORAL
COMMUNITY
Start: 2023-11-28

## 2024-01-11 SDOH — ECONOMIC STABILITY: FOOD INSECURITY: WITHIN THE PAST 12 MONTHS, THE FOOD YOU BOUGHT JUST DIDN'T LAST AND YOU DIDN'T HAVE MONEY TO GET MORE.: NEVER TRUE

## 2024-01-11 SDOH — ECONOMIC STABILITY: FOOD INSECURITY: WITHIN THE PAST 12 MONTHS, YOU WORRIED THAT YOUR FOOD WOULD RUN OUT BEFORE YOU GOT MONEY TO BUY MORE.: NEVER TRUE

## 2024-01-11 ASSESSMENT — COLUMBIA-SUICIDE SEVERITY RATING SCALE - C-SSRS
2. HAVE YOU ACTUALLY HAD ANY THOUGHTS OF KILLING YOURSELF?: NO
6. HAVE YOU EVER DONE ANYTHING, STARTED TO DO ANYTHING, OR PREPARED TO DO ANYTHING TO END YOUR LIFE?: NO
1. IN THE PAST MONTH, HAVE YOU WISHED YOU WERE DEAD OR WISHED YOU COULD GO TO SLEEP AND NOT WAKE UP?: NO

## 2024-01-11 ASSESSMENT — PAIN SCALES - GENERAL
PAINLEVEL: 0-NO PAIN
PAINLEVEL_OUTOF10: 0 - NO PAIN

## 2024-01-11 ASSESSMENT — PATIENT HEALTH QUESTIONNAIRE - PHQ9
SUM OF ALL RESPONSES TO PHQ9 QUESTIONS 1 AND 2: 0
1. LITTLE INTEREST OR PLEASURE IN DOING THINGS: NOT AT ALL
2. FEELING DOWN, DEPRESSED OR HOPELESS: NOT AT ALL

## 2024-01-11 ASSESSMENT — ENCOUNTER SYMPTOMS
DEPRESSION: 0
LOSS OF SENSATION IN FEET: 0
OCCASIONAL FEELINGS OF UNSTEADINESS: 0

## 2024-01-11 ASSESSMENT — PAIN - FUNCTIONAL ASSESSMENT: PAIN_FUNCTIONAL_ASSESSMENT: 0-10

## 2024-01-11 NOTE — PROGRESS NOTES
"Subjective   Patient ID: Bobbi Oliveira is a 68 y.o. female who presents for Vertigo.    HPI  Patient here for vertigo.   End of June was having issues with her hip. PCP prescribed steroids and Naproxen. Shortly after she flipped over in bed and everything was spinning. She took Dramamine. She has a nurse friend and did the Epley maneuver at home and it got better. She was then doing PT for her hip, flipped over and had the spinning again. She went to the ED after this on 11/28/2023. Was given Zofran, Meclizine and Valium which improved. She was very nauseous when trying to do the Epley maneuver. Has not had any episodes since November, avoids position changes at times, fearful of the vertigo starting.   No other dizziness. After the last time, she felt \"weird\" at times for about 2 weeks. No headaches or vision changes. Bright lights, loud sounds, pressure induced dizziness and autophony were denied.   No recent head trauma.   No hearing loss. Occasional tinnitus. Denies ear pain or drainage. No previous ear surgery. No loud noise exposure. Father had hearing loss.     Patient Active Problem List   Diagnosis    Hypertension    Mixed hyperlipidemia    Lumbago    ALEC (generalized anxiety disorder)    Piriformis muscle pain    Vitamin D deficiency    Secondary insomnia    Positive colorectal cancer screening using Cologuard test    Anxiety with flying    Prediabetes    Overweight with body mass index (BMI) of 28 to 28.9 in adult    Mass of breast    Vaginal atrophy    Leukocytosis    Medicare annual wellness visit, subsequent    Primary osteoarthritis involving multiple joints     Past Surgical History:   Procedure Laterality Date    DILATION AND CURETTAGE OF UTERUS  02/09/2018    Dilation And Curettage    OTHER SURGICAL HISTORY  11/20/2020    Dilation and curettage     Review of Systems    All other systems have been reviewed and are negative for complaints except for those mentioned in history of present " illness, past medical history and problem list.    Objective   Physical Exam    Constitutional: No fever, chills, weight loss or weight gain  General appearance: Appears well, well-nourished, well groomed. No acute distress.    Communication: Normal communication    Psychiatric: Oriented to person, place and time. Normal mood and affect.    Neurologic: Cranial nerves II-XII grossly intact and symmetric bilaterally.    Head and Face:  Head: Atraumatic with no masses, lesions or scarring.  Face: Normal symmetry. No scars or deformities.  TMJ: Normal, no trismus.    Eyes: Conjunctiva not edematous or erythematous. PERRLA    Right Ear: External inspection of ear with no deformity, scars, or masses. EAC is clear.  TM is intact with no sign of infection, effusion, or retraction.  No perforation seen.     Left Ear: External inspection of ear with no deformity, scars, or masses. EAC is clear.  TM is intact with no sign of infection, effusion, or retraction.  No perforation seen.     Nose: External inspection of nose: No nasal lesions, lacerations or scars. Anterior rhinoscopy with limited visualization past the inferior turbinates. No tenderness on frontal or maxillary sinus palpation.    Oral Cavity/Mouth: Oral cavity and oropharynx mucosa moist and pink. No lesions or masses. Dentition normal. Tonsils appear normal. Uvula is midline. Tongue with no masses or lesions. Tongue with good mobility. The oropharynx is clear.    Neck: Normal appearing, symmetric, trachea midline.     Cardiovascular: Examination of peripheral vascular system shows no clubbing or cyanosis.    Respiratory: No respiratory distress increased work of breathing. Inspection of the chest with symmetric chest expansion and normal respiratory effort.    Skin: No head and neck rashes.    Lymph nodes: No adenopathy.     On vestibular exam, oculomotor function assessment shows normal ocular pursuits and saccades. There is no spontaneous nystagmus. Head Thrust  test is negative bilaterally.  Postural testing performed. Romberg is negative for any obvious weakness/sway. Tandem Gait is negative for any obvious weakness/sway.     I personally reviewed ED note from November 2023.     Diagnostic Results     I personally interpreted audiogram from today which shows normal sloping to moderately-severe SNHL bilaterally. Type A tympanograms. Excellent WRS of 100% at 65 dB bilaterally.     Assessment/Plan   Diagnoses and all orders for this visit:  Vertigo likely secondary to BPPV  -     Referral to Physical Therapy; Future. Patient sees a PT at [facility] who she will return to. She will follow up if vertigo does not continue to improve.   Sensorineural hearing loss (SNHL) of both ears  Repeat audiogram annually to monitor hearing.     JUAN CARLOS Garvin-CNP 01/11/24 11:08 AM

## 2024-01-11 NOTE — LETTER
January 11, 2024     OLY Garvin  [address]  [place] [address]    Patient: Bobbi Oliveira   YOB: 1955   Date of Visit: 1/11/2024       Dear OLY Tan:    Thank you for referring Bobbi Oliveira to me for evaluation. Below are my notes for this consultation.  If you have questions, please do not hesitate to call me. I look forward to following your patient along with you.       Sincerely,     Amisha Delgado, ARDEN, CCC-A      CC: No Recipients  ______________________________________________________________________________________    Name: Bobbi Oliveira  YOB: 1955  Age: 68 y.o.    Date of Evaluation:  1/11/2024     History:  Reason for visit:  Bobbi Oliveira is seen today at the request of Christine Mabry CNP for an evaluation of hearing.  Patient complains of Dizziness.  She has had 2 bouts of room spinning in the past 6 months that is typically triggered by head position changes. She also reports non-bothersome tinnitus bilaterally. Bobbi Oliveira denies hearing loss, aural fullness, ear infections, ear surgery, loud noise exposure, and family history of hearing loss.       Evaluation:    Otoscopy revealed clear ear canal and tympanic membrane visualized bilaterally.  Immittance testing indicated normal middle ear pressure, mobility, and ear canal volume bilaterally.   Ipsilateral acoustic reflexes were present at 500-4000 Hz bilaterally    Behavioral hearing testing indicated normal to moderately-severe sensorineural hearing loss bilaterally.  Word recognition testing was completed using recorded speech at the patient's most comfortable level as documented on the audiogram.  Scores were excellent bilaterally.    Impression:    Testing indicated normal to moderately-severe sensorineural hearing loss with normal middle ear pressure, mobility, and ear canal volume bilaterally.      Care Plan:    Follow-up with Christine Mabry CNP.  Retest hearing annually to monitor.  Hearing aid evaluation. Patient was encouraged to contact the insurance company to inquire about hearing aid benefits and in-network hearing aid providers, if applicable.  If no insurance benefits are available, [facility] can provide hearing aid services.     ARDEN Koroma, CCC-A  Audiologist    Time:  [id number]

## 2024-01-11 NOTE — PROGRESS NOTES
Name: Bobbi Oilveira  YOB: 1955  Age: 68 y.o.    Date of Evaluation:  1/11/2024     History:  Reason for visit:  Bobbi Oliveira is seen today at the request of Christine Mabry CNP for an evaluation of hearing.  Patient complains of Dizziness.  She has had 2 bouts of room spinning in the past 6 months that is typically triggered by head position changes. She also reports non-bothersome tinnitus bilaterally. Bobbi Oliveira denies hearing loss, aural fullness, ear infections, ear surgery, loud noise exposure, and family history of hearing loss.       Evaluation:    Otoscopy revealed clear ear canal and tympanic membrane visualized bilaterally.  Immittance testing indicated normal middle ear pressure, mobility, and ear canal volume bilaterally.   Ipsilateral acoustic reflexes were present at 500-4000 Hz bilaterally    Behavioral hearing testing indicated normal to moderately-severe sensorineural hearing loss bilaterally.  Word recognition testing was completed using recorded speech at the patient's most comfortable level as documented on the audiogram.  Scores were excellent bilaterally.    Impression:    Testing indicated normal to moderately-severe sensorineural hearing loss with normal middle ear pressure, mobility, and ear canal volume bilaterally.     Care Plan:    Follow-up with Christine Mabry CNP.  Retest hearing annually to monitor.  Hearing aid evaluation. Patient was encouraged to contact the insurance company to inquire about hearing aid benefits and in-network hearing aid providers, if applicable.  If no insurance benefits are available, [facility] can provide hearing aid services.     ARDEN Koroma, CCC-A  Audiologist    Time:  [id number]

## 2024-01-12 ENCOUNTER — ANCILLARY PROCEDURE (OUTPATIENT)
Dept: RADIOLOGY | Facility: CLINIC | Age: 69
End: 2024-01-12
Payer: MEDICARE

## 2024-01-12 DIAGNOSIS — Z12.31 ENCOUNTER FOR SCREENING MAMMOGRAM FOR BREAST CANCER: ICD-10-CM

## 2024-01-12 PROCEDURE — 77067 SCR MAMMO BI INCL CAD: CPT | Performed by: RADIOLOGY

## 2024-01-12 PROCEDURE — 77067 SCR MAMMO BI INCL CAD: CPT

## 2024-01-12 PROCEDURE — 77063 BREAST TOMOSYNTHESIS BI: CPT | Performed by: RADIOLOGY

## 2024-01-16 ENCOUNTER — TELEPHONE (OUTPATIENT)
Dept: ORTHOPEDIC SURGERY | Facility: CLINIC | Age: 69
End: 2024-01-16
Payer: MEDICARE

## 2024-01-16 NOTE — TELEPHONE ENCOUNTER
Pt. Scheduled for PRP injection of rt hip on 1/30/24 9:00 AM.  She has questions about medications. She is aware she cannot take NSAID's for 2 weeks leading up to the appointment. She wonders if tylenol and her prescribed muscle relaxer is safe to take. Please advise.

## 2024-01-30 ENCOUNTER — OFFICE VISIT (OUTPATIENT)
Dept: ORTHOPEDIC SURGERY | Facility: CLINIC | Age: 69
End: 2024-01-30

## 2024-01-30 VITALS — HEIGHT: 65 IN | WEIGHT: 165 LBS | BODY MASS INDEX: 27.49 KG/M2

## 2024-01-30 DIAGNOSIS — M25.551 PAIN OF RIGHT HIP: ICD-10-CM

## 2024-01-30 DIAGNOSIS — M16.11 ARTHRITIS OF RIGHT HIP: Primary | ICD-10-CM

## 2024-01-30 PROCEDURE — 1160F RVW MEDS BY RX/DR IN RCRD: CPT | Performed by: FAMILY MEDICINE

## 2024-01-30 PROCEDURE — 1036F TOBACCO NON-USER: CPT | Performed by: FAMILY MEDICINE

## 2024-01-30 PROCEDURE — 1159F MED LIST DOCD IN RCRD: CPT | Performed by: FAMILY MEDICINE

## 2024-01-30 PROCEDURE — 0232T NJX PLATELET PLASMA: CPT | Performed by: FAMILY MEDICINE

## 2024-01-30 PROCEDURE — 3008F BODY MASS INDEX DOCD: CPT | Performed by: FAMILY MEDICINE

## 2024-01-30 PROCEDURE — 1126F AMNT PAIN NOTED NONE PRSNT: CPT | Performed by: FAMILY MEDICINE

## 2024-01-30 RX ORDER — TRAMADOL HYDROCHLORIDE 50 MG/1
50 TABLET ORAL EVERY 8 HOURS PRN
Qty: 15 TABLET | Refills: 0 | Status: SHIPPED | OUTPATIENT
Start: 2024-01-30 | End: 2024-02-04

## 2024-01-30 NOTE — PROGRESS NOTES
History of Present Illness   Chief Complaint   Patient presents with    Right Hip - Follow-up     PRP INJECTION        The patient is 68 y.o. female  here for right hip PRP injection for treatment of underlying osteoarthritis.  See previous notes for full details of history.  In brief patient has been dealing with some atraumatic right hip pain for the past 6 months.  X-rays showed some mild to moderate degenerative changes however MRI showed more advanced degenerative changes of the right hip.  She has tried conservative management with meloxicam as well as 1 prior hip joint injection with Kenalog, this provided good but short-term relief for around 10 days, she is not ready for surgical intervention at this time, we have planned PRP injection today as alternative conservative treatment option.  She has been off her meloxicam for the past 2 weeks.    Past Medical History:   Diagnosis Date    Acute frontal sinusitis, unspecified 05/22/2020    Sinusitis, acute frontal    Acute maxillary sinusitis, unspecified 05/22/2020    Sinusitis, acute maxillary    Dysfunction of left eustachian tube 08/21/2023    Generalized anxiety disorder 01/10/2020    Anxiety, generalized    Nervousness 01/10/2020    Nervously anxious    Personal history of other endocrine, nutritional and metabolic disease 05/03/2021    History of hyperlipidemia    Personal history of other infectious and parasitic diseases 02/09/2018    History of herpes zoster    Personal history of other medical treatment 04/13/2021    History of screening mammography       Medication Documentation Review Audit       Reviewed by Alecia May LPN (Licensed Nurse) on 01/11/24 at 1102      Medication Order Taking? Sig Documenting Provider Last Dose Status   ALPRAZolam (Xanax) 0.5 mg tablet [id number] Yes Take 1 tablet (0.5 mg) by mouth once daily as needed for anxiety. Historical Provider, MD Taking Active   amLODIPine-benazepriL (Lotrel) 5-10 mg capsule [id number]  Yes TAKE 1 CAPSULE DAILY Corry Turk MD Taking Active   atorvastatin (Lipitor) 20 mg tablet [id number] Yes TAKE 1 TABLET AT BEDTIME Corry Turk MD Taking Active   calcium carbonate (CALCIUM 600 ORAL) [id number] Yes Take by mouth. Historical Provider, MD Taking Active   diclofenac (Voltaren) 75 mg EC tablet [id number] No Take 1 tablet (75 mg) by mouth 2 times a day as needed (pain). Do not crush, chew, or split.   Patient not taking: Reported on 1/11/2024    Corry Turk MD Not Taking Active   doxylamine succinate (UNISOM, DOXYLAMINE, ORAL) [id number] Yes Take 0.25 tablets by mouth once daily at bedtime. Historical Provider, MD Taking Active   estrogens, conjugated, (Premarin) vaginal cream [id number] No Apply a pea-sized amount to vaginal opening 2 times per week Historical Provider, MD Not Taking Active   fenofibrate (Tricor) 145 mg tablet [id number] No Take 1 tablet (145 mg) by mouth once daily. Historical Provider, MD Not Taking Active   hydrOXYzine HCL (Atarax) 25 mg tablet [id number] Yes Take 1 tablet (25 mg) by mouth once daily as needed for anxiety. Corry Turk MD Taking Active   LORazepam (Ativan) 1 mg tablet [id number] No Take 1 tablet (1 mg) by mouth see administration instructions for 1 dose. Take one tablet by mouth 1 hour prior to MRI   Patient not taking: Reported on 1/11/2024    Anurag Hicks MD Not Taking Active   meclizine (Antivert) 25 mg tablet [id number] Yes  Historical Provider, MD Taking Active   meloxicam (Mobic) 7.5 mg tablet [id number] Yes Take 1 tablet (7.5 mg) by mouth once daily. Corry Turk MD Taking Active   methocarbamol (Robaxin) 500 mg tablet [id number] Yes Take 1 tablet (500 mg) by mouth 3 times a day. Corry Turk MD Taking Active   PARoxetine (Paxil) 10 mg tablet [id number] Yes TAKE 1 TABLET DAILY AS     DIRECTED Corry Turk MD Taking Active   VALERIAN ROOT ORAL [id number] No Take by mouth as needed at bedtime (anxiety). Historical Provider, MD Not Taking Active    ZINC ORAL [id number] No Take by mouth. Historical Provider, MD Not Taking Active                    Allergies   Allergen Reactions    Aspirin Other and Unknown     bruising    Increase bruising per pt    bruising    Increase bruising per pt       Social History     Socioeconomic History    Marital status: Unknown     Spouse name: Not on file    Number of children: Not on file    Years of education: Not on file    Highest education level: Not on file   Occupational History    Not on file   Tobacco Use    Smoking status: Never    Smokeless tobacco: Never   Substance and Sexual Activity    Alcohol use: Yes     Comment: OCCASIONAL    Drug use: Never    Sexual activity: Not on file   Other Topics Concern    Not on file   Social History Narrative    Not on file     Social Determinants of Health     Financial Resource Strain: Not on file   Food Insecurity: No Food Insecurity (1/11/2024)    Hunger Vital Sign     Worried About Running Out of Food in the Last Year: Never true     Ran Out of Food in the Last Year: Never true   Transportation Needs: Not on file   Physical Activity: Not on file   Stress: Not on file   Social Connections: Not on file   Intimate Partner Violence: Not on file   Housing Stability: Not on file       Past Surgical History:   Procedure Laterality Date    DILATION AND CURETTAGE OF UTERUS  02/09/2018    Dilation And Curettage    OTHER SURGICAL HISTORY  11/20/2020    Dilation and curettage          Review of Systems   GENERAL: Negative  GI: Negative  MUSCULOSKELETAL: See HPI  SKIN: Negative  NEURO:  Negative     Physical Exam:    General/Constitutional: well appearing, no distress, appears stated age  HEENT: sclera clear  Respiratory: non labored breathing  Vascular: No edema, swelling or tenderness, except as noted in detailed exam.  Integumentary: No impressive skin lesions present, except as noted in detailed exam.  Neurological:  Alert and oriented   Psychological:  Normal mood and  affect.  Musculoskeletal: Normal, except as noted in detailed exam and in HPI      PROCEDURE    Right hip joint leukocyte poor PRP injection under ultrasound guidance    Consent:  After the risks and benefits of the procedure were explained, consent was given by the patient to proceed.     Procedure:    52 cc of peripheral blood with extracted from the patient at the left AC fossa. This was mixed with 8cc of anticoagulant. The blood the then centrifuged using the [employer]ol[employer] Bogdan system to obtain ~ 3cc of leukocyte poor PRP     The right hip joint and surrounding structures was then visualized under ultrasound guidance. The injection site was prepped and cleaned with Betadine solution.     Under ultrasound guidance the right hip joint was injected with PRP solution obtained from patient as above.  During injection, there was unrestricted flow and care was taken not to inject corticosteroid into the skin or subcutaneous tissues.     A sterile band-aide was applied.  Post-injection instructions were given regarding post-procedure care, when to follow up in clinic and what to expect from the procedure.  The patient tolerated the injection well and was discharged without complication.      Assessment   1. Arthritis of right hip  traMADol (Ultram) 50 mg tablet      2. Pain of right hip  Point of Care Ultrasound    traMADol (Ultram) 50 mg tablet            Plan: Successful ultrasound-guided right hip joint injection with leukocyte poor PRP for the treatment of underlying osteoarthritis which was advanced on recent MRI.  Patient tolerated procedure without issue, I did recommend that she refrain from NSAIDs for the next 1 week, she was given a prescription for tramadol to take for as needed breakthrough pain as she may have some slightly worsening pain for the next few days.  I will have her follow-up in 2 months for reassessment

## 2024-03-26 ENCOUNTER — OFFICE VISIT (OUTPATIENT)
Dept: ORTHOPEDIC SURGERY | Facility: CLINIC | Age: 69
End: 2024-03-26
Payer: MEDICARE

## 2024-03-26 DIAGNOSIS — M54.31 SCIATICA OF RIGHT SIDE: ICD-10-CM

## 2024-03-26 DIAGNOSIS — M16.11 ARTHRITIS OF RIGHT HIP: Primary | ICD-10-CM

## 2024-03-26 PROCEDURE — 3008F BODY MASS INDEX DOCD: CPT | Performed by: FAMILY MEDICINE

## 2024-03-26 PROCEDURE — 1159F MED LIST DOCD IN RCRD: CPT | Performed by: FAMILY MEDICINE

## 2024-03-26 PROCEDURE — 1160F RVW MEDS BY RX/DR IN RCRD: CPT | Performed by: FAMILY MEDICINE

## 2024-03-26 PROCEDURE — 99214 OFFICE O/P EST MOD 30 MIN: CPT | Performed by: FAMILY MEDICINE

## 2024-03-26 PROCEDURE — 1036F TOBACCO NON-USER: CPT | Performed by: FAMILY MEDICINE

## 2024-03-26 RX ORDER — METHYLPREDNISOLONE 4 MG/1
TABLET ORAL
Qty: 21 TABLET | Refills: 0 | Status: SHIPPED | OUTPATIENT
Start: 2024-03-26

## 2024-03-26 NOTE — PROGRESS NOTES
History of Present Illness   Chief Complaint   Patient presents with    Right Hip - Follow-up     PRP has improvement       The patient is 68 y.o. female  here for right hip pain/osteoarthritis.  Patient was last seen by me approximately 2 months ago, see previous notes for full details.  In brief patient has been dealing with atraumatic right hip pain for the past 8 months or so, initial x-ray showed some mild to moderate degenerative changes, MRI did show more advanced generative changes, she had short-term response to intra-articular corticosteroid injection, at her most recent visit 2 months ago we did treat with intra-articular PRP injection to see if this would provide more lasting relief.  She admits to good response to injection with around 70% improvement in her groin pain.  Some days she forgets that she has any issues in this area of her hip.  She has noticed some new onset pain over the posterior lateral aspect of hip with some radiation of pain down the lateral aspect of her leg past her knee into her lower leg and on occasionally into her foot.  Has been ongoing for the past 10 days, she denies any new injury, she did find some stretches for piriformis syndrome that she has been doing which seem to be helping.  She denies any history of any similar symptoms in the past.      Past Medical History:   Diagnosis Date    Acute frontal sinusitis, unspecified 05/22/2020    Sinusitis, acute frontal    Acute maxillary sinusitis, unspecified 05/22/2020    Sinusitis, acute maxillary    Dysfunction of left eustachian tube 08/21/2023    Generalized anxiety disorder 01/10/2020    Anxiety, generalized    Nervousness 01/10/2020    Nervously anxious    Personal history of other endocrine, nutritional and metabolic disease 05/03/2021    History of hyperlipidemia    Personal history of other infectious and parasitic diseases 02/09/2018    History of herpes zoster    Personal history of other medical treatment  04/13/2021    History of screening mammography       Medication Documentation Review Audit       Reviewed by Anurag Hicks MD (Physician) on 01/30/24 at 0921      Medication Order Taking? Sig Documenting Provider Last Dose Status   ALPRAZolam (Xanax) 0.5 mg tablet [id number] No Take 1 tablet (0.5 mg) by mouth once daily as needed for anxiety. Historical Provider, MD Taking Active   amLODIPine-benazepriL (Lotrel) 5-10 mg capsule [id number] No TAKE 1 CAPSULE DAILY Corry Turk MD Taking Active   atorvastatin (Lipitor) 20 mg tablet [id number] No TAKE 1 TABLET AT BEDTIME Corry Turk MD Taking Active   calcium carbonate (CALCIUM 600 ORAL) [id number] No Take by mouth. Historical Provider, MD Taking Active   diclofenac (Voltaren) 75 mg EC tablet [id number] No Take 1 tablet (75 mg) by mouth 2 times a day as needed (pain). Do not crush, chew, or split.   Patient not taking: Reported on 1/11/2024    Corry Turk MD Not Taking Active   doxylamine succinate (UNISOM, DOXYLAMINE, ORAL) [id number] No Take 0.25 tablets by mouth once daily at bedtime. Historical Provider, MD Taking Active   estrogens, conjugated, (Premarin) vaginal cream [id number] No Apply a pea-sized amount to vaginal opening 2 times per week Historical Provider, MD Not Taking Active   fenofibrate (Tricor) 145 mg tablet [id number] No Take 1 tablet (145 mg) by mouth once daily. Historical Provider, MD Not Taking Active   hydrOXYzine HCL (Atarax) 25 mg tablet [id number] No Take 1 tablet (25 mg) by mouth once daily as needed for anxiety. Corry Turk MD Taking Active   LORazepam (Ativan) 1 mg tablet [id number] No Take 1 tablet (1 mg) by mouth see administration instructions for 1 dose. Take one tablet by mouth 1 hour prior to MRI   Patient not taking: Reported on 1/11/2024    Anurag Hicks MD Not Taking Active   meclizine (Antivert) 25 mg tablet [id number] No  Historical Provider, MD Taking Active   meloxicam (Mobic) 7.5 mg tablet [id number] No Take 1 tablet (7.5  mg) by mouth once daily. Corry Turk MD Taking Active   methocarbamol (Robaxin) 500 mg tablet [id number] No Take 1 tablet (500 mg) by mouth 3 times a day. Corry Turk MD Taking Active   PARoxetine (Paxil) 10 mg tablet [id number] No TAKE 1 TABLET DAILY AS     DIRECTED Corry Turk MD Taking Active   traMADol (Ultram) 50 mg tablet [id number]  Take 1 tablet (50 mg) by mouth every 8 hours if needed for severe pain (7 - 10) for up to 5 days. Anurag Hicks MD  Active   VALERIAN ROOT ORAL [id number] No Take by mouth as needed at bedtime (anxiety). Historical Provider, MD Not Taking Active   ZINC ORAL [id number] No Take by mouth. Historical Provider, MD Not Taking Active                    Allergies   Allergen Reactions    Aspirin Other and Unknown     bruising    Increase bruising per pt    bruising    Increase bruising per pt       Social History     Socioeconomic History    Marital status: Unknown     Spouse name: Not on file    Number of children: Not on file    Years of education: Not on file    Highest education level: Not on file   Occupational History    Not on file   Tobacco Use    Smoking status: Never    Smokeless tobacco: Never   Substance and Sexual Activity    Alcohol use: Yes     Comment: OCCASIONAL    Drug use: Never    Sexual activity: Not on file   Other Topics Concern    Not on file   Social History Narrative    Not on file     Social Determinants of Health     Financial Resource Strain: Not on file   Food Insecurity: No Food Insecurity (1/11/2024)    Hunger Vital Sign     Worried About Running Out of Food in the Last Year: Never true     Ran Out of Food in the Last Year: Never true   Transportation Needs: Not on file   Physical Activity: Not on file   Stress: Not on file   Social Connections: Not on file   Intimate Partner Violence: Not on file   Housing Stability: Not on file       Past Surgical History:   Procedure Laterality Date    DILATION AND CURETTAGE OF UTERUS  02/09/2018    Dilation And  Curettage    OTHER SURGICAL HISTORY  11/20/2020    Dilation and curettage          Review of Systems   GENERAL: Negative  GI: Negative  MUSCULOSKELETAL: See HPI  SKIN: Negative  NEURO:  Negative     Physical Exam:    General/Constitutional: well appearing, no distress, appears stated age  HEENT: sclera clear  Respiratory: non labored breathing  Vascular: No edema, swelling or tenderness, except as noted in detailed exam.  Integumentary: No impressive skin lesions present, except as noted in detailed exam.  Neurological:  Alert and oriented   Psychological:  Normal mood and affect.  Musculoskeletal: Normal, except as noted in detailed exam and in HPI    Right hip: Normal appearance, no skin changes, no facial deformity.  There are some mild tenderness at the lateral hip near the greater trochanter as well as posteriorly near the piriformis muscle belly.  No SI joint tenderness.  She has some decreased range of motion at the hip, flexion 95 degrees, internal rotation 10 degrees but no significant exacerbation of anterior hip or groin pain.  Negative Stinchfield test today.  There is some posterior hip discomfort with piriformis stretch/figure-of-four.  No other motor deficits are present at the hip.  She has good mobility of the lumbar spine in all directions, there is no lower extremity motor or sensory deficits.      Assessment   1. Arthritis of right hip        2. Sciatica of right side  methylPREDNISolone (Medrol Dospak) 4 mg tablets              Plan: Right hip pain from osteoarthritis has had good response to PRP injection 2 months ago, still think some mild discomfort intermittently.  We discussed treatments including conservative management, explained that she could continue to see improvement sometimes up to 6 months after injection.  She did ask about potential repeat injection, said we will monitor for any plateauing of symptom improvement over the next month and if she would like to pursue another  injection she can call to schedule, no she will need to be off meloxicam/NSAIDs for at least 2 weeks before injection    With regards to her new pain in the right posterior lateral hip with radiation down the pain, does appear to be potentially radicular in nature, atraumatic.  Recommended conservative management.  She was given a prescription for a Medrol Dosepak, she was given some home exercises to begin.  She will follow-up if symptoms are ongoing despite this treatment.

## 2024-04-23 ENCOUNTER — TELEPHONE (OUTPATIENT)
Dept: ORTHOPEDIC SURGERY | Facility: CLINIC | Age: 69
End: 2024-04-23
Payer: MEDICARE

## 2024-05-07 ENCOUNTER — HOSPITAL ENCOUNTER (OUTPATIENT)
Dept: RADIOLOGY | Facility: EXTERNAL LOCATION | Age: 69
Discharge: HOME | End: 2024-05-07

## 2024-05-07 ENCOUNTER — OFFICE VISIT (OUTPATIENT)
Dept: ORTHOPEDIC SURGERY | Facility: CLINIC | Age: 69
End: 2024-05-07

## 2024-05-07 DIAGNOSIS — M16.11 ARTHRITIS OF RIGHT HIP: Primary | ICD-10-CM

## 2024-05-07 PROCEDURE — 1159F MED LIST DOCD IN RCRD: CPT | Performed by: FAMILY MEDICINE

## 2024-05-07 PROCEDURE — 1036F TOBACCO NON-USER: CPT | Performed by: FAMILY MEDICINE

## 2024-05-07 PROCEDURE — 0232T NJX PLATELET PLASMA: CPT | Performed by: FAMILY MEDICINE

## 2024-05-07 PROCEDURE — 1160F RVW MEDS BY RX/DR IN RCRD: CPT | Performed by: FAMILY MEDICINE

## 2024-05-07 PROCEDURE — 3008F BODY MASS INDEX DOCD: CPT | Performed by: FAMILY MEDICINE

## 2024-05-07 RX ORDER — TRAMADOL HYDROCHLORIDE 50 MG/1
50 TABLET ORAL EVERY 8 HOURS PRN
Qty: 10 TABLET | Refills: 0 | Status: SHIPPED | OUTPATIENT
Start: 2024-05-07 | End: 2024-05-12

## 2024-05-07 NOTE — PROGRESS NOTES
History of Present Illness   Chief Complaint   Patient presents with    Right Hip - Injections     PRP INJECTION       The patient is 68 y.o. female  here for right hip PRP injection for treatment of underlying osteoarthritis.  Patient has arthritis of right hip, MRI showed more advanced arthritis compared to x-ray findings.  We did do initial PRP injection approximately 4 months ago, she did admit to improvement in symptoms and feels like symptoms plateaued, she is having less pain with day-to-day activity, she was interested in pursuing a repeat PRP injection today.  She has been off NSAIDs for the past 2 weeks.  Prior Kenalog injections provided short-term relief.      Past Medical History:   Diagnosis Date    Acute frontal sinusitis, unspecified 05/22/2020    Sinusitis, acute frontal    Acute maxillary sinusitis, unspecified 05/22/2020    Sinusitis, acute maxillary    Dysfunction of left eustachian tube 08/21/2023    Generalized anxiety disorder 01/10/2020    Anxiety, generalized    Nervousness 01/10/2020    Nervously anxious    Personal history of other endocrine, nutritional and metabolic disease 05/03/2021    History of hyperlipidemia    Personal history of other infectious and parasitic diseases 02/09/2018    History of herpes zoster    Personal history of other medical treatment 04/13/2021    History of screening mammography       Medication Documentation Review Audit       Reviewed by Anurag Hicks MD (Physician) on 03/26/24 at 1158      Medication Order Taking? Sig Documenting Provider Last Dose Status   ALPRAZolam (Xanax) 0.5 mg tablet [id number] No Take 1 tablet (0.5 mg) by mouth once daily as needed for anxiety. Historical Provider, MD Taking Active   amLODIPine-benazepriL (Lotrel) 5-10 mg capsule [id number] No TAKE 1 CAPSULE DAILY Corry Turk MD Taking Active   atorvastatin (Lipitor) 20 mg tablet [id number] No TAKE 1 TABLET AT BEDTIME Corry Turk MD Taking Active   calcium carbonate (CALCIUM  600 ORAL) [id number] No Take by mouth. Historical Provider, MD Taking Active   diclofenac (Voltaren) 75 mg EC tablet [id number] No Take 1 tablet (75 mg) by mouth 2 times a day as needed (pain). Do not crush, chew, or split.   Patient not taking: Reported on 1/11/2024    Corry Turk MD Not Taking Active   doxylamine succinate (UNISOM, DOXYLAMINE, ORAL) [id number] No Take 0.25 tablets by mouth once daily at bedtime. Historical Provider, MD Taking Active   estrogens, conjugated, (Premarin) vaginal cream [id number] No Apply a pea-sized amount to vaginal opening 2 times per week Historical Provider, MD Not Taking Active   fenofibrate (Tricor) 145 mg tablet [id number] No Take 1 tablet (145 mg) by mouth once daily. Historical Provider, MD Not Taking Active   hydrOXYzine HCL (Atarax) 25 mg tablet [id number] No Take 1 tablet (25 mg) by mouth once daily as needed for anxiety. Corry Turk MD Taking Active   LORazepam (Ativan) 1 mg tablet [id number] No Take 1 tablet (1 mg) by mouth see administration instructions for 1 dose. Take one tablet by mouth 1 hour prior to MRI   Patient not taking: Reported on 1/11/2024    Anurag Hicks MD Not Taking Active   meclizine (Antivert) 25 mg tablet [id number] No  Historical Provider, MD Taking Active   meloxicam (Mobic) 7.5 mg tablet [id number] No Take 1 tablet (7.5 mg) by mouth once daily. Corry Turk MD Taking Active   methocarbamol (Robaxin) 500 mg tablet [id number] No Take 1 tablet (500 mg) by mouth 3 times a day. Corry Turk MD Taking Active   methylPREDNISolone (Medrol Dospak) 4 mg tablets [id number]  Use as directed by package instructions Anurag Hicks MD  Active   PARoxetine (Paxil) 10 mg tablet [id number] No TAKE 1 TABLET DAILY AS     DIRECTED Corry Turk MD Taking Active   VALERIAN ROOT ORAL [id number] No Take by mouth as needed at bedtime (anxiety). Wood Diamond MD Not Taking Active   ZINC ORAL [id number] No Take by mouth. Historical Provider, MD Not Taking  Active                    Allergies   Allergen Reactions    Aspirin Other and Unknown     bruising    Increase bruising per pt    bruising    Increase bruising per pt       Social History     Socioeconomic History    Marital status: Unknown     Spouse name: Not on file    Number of children: Not on file    Years of education: Not on file    Highest education level: Not on file   Occupational History    Not on file   Tobacco Use    Smoking status: Never    Smokeless tobacco: Never   Substance and Sexual Activity    Alcohol use: Yes     Comment: OCCASIONAL    Drug use: Never    Sexual activity: Not on file   Other Topics Concern    Not on file   Social History Narrative    Not on file     Social Determinants of Health     Financial Resource Strain: Not on file   Food Insecurity: No Food Insecurity (1/11/2024)    Hunger Vital Sign     Worried About Running Out of Food in the Last Year: Never true     Ran Out of Food in the Last Year: Never true   Transportation Needs: Not on file   Physical Activity: Not on file   Stress: Not on file   Social Connections: Not on file   Intimate Partner Violence: Not on file   Housing Stability: Not on file       Past Surgical History:   Procedure Laterality Date    DILATION AND CURETTAGE OF UTERUS  02/09/2018    Dilation And Curettage    OTHER SURGICAL HISTORY  11/20/2020    Dilation and curettage          Review of Systems   GENERAL: Negative  GI: Negative  MUSCULOSKELETAL: See HPI  SKIN: Negative  NEURO:  Negative     Physical Exam:    General/Constitutional: well appearing, no distress, appears stated age  HEENT: sclera clear  Respiratory: non labored breathing  Vascular: No edema, swelling or tenderness, except as noted in detailed exam.  Integumentary: No impressive skin lesions present, except as noted in detailed exam.  Neurological:  Alert and oriented   Psychological:  Normal mood and affect.  Musculoskeletal: Normal, except as noted in detailed exam and in HPI    Right hip:  Normal appearance, no overlying skin changes, no redness or warmth.    PROCEDURE    Right hip joint leukocyte poor PRP injection under ultrasound guidance    Consent:  After the risks and benefits of the procedure were explained, consent was given by the patient to proceed.     Procedure:    52 cc of peripheral blood with extracted from the patient at the left AC fossa. This was mixed with 8cc of anticoagulant. The blood the then centrifuged using the [employer] system to obtain ~ 3cc of leukocyte poor PRP     The right hip joint and surrounding structures was then visualized under ultrasound guidance. The injection site was prepped and cleaned with Betadine solution.     Under ultrasound guidance the right hip joint was injected with PRP solution obtained from patient as above.  During injection, there was unrestricted flow and care was taken not to inject corticosteroid into the skin or subcutaneous tissues.     A sterile band-aide was applied.  Post-injection instructions were given regarding post-procedure care, when to follow up in clinic and what to expect from the procedure.  The patient tolerated the injection well and was discharged without complication.      Assessment   1. Arthritis of right hip  Point of Care Ultrasound    traMADol (Ultram) 50 mg tablet            Plan: Successful ultrasound-guided right hip joint injection with leukocyte poor PRP for the treatment of underlying osteoarthritis. Patient tolerated procedure without issue, I did recommend that she refrain from NSAIDs for the next 1 week, she was given a prescription for tramadol to take for as needed breakthrough pain as she may have some slightly worsening pain for the next few days.  She will plan to follow-up as symptoms dictate

## 2024-06-20 DIAGNOSIS — R39.9 UTI SYMPTOMS: Primary | ICD-10-CM

## 2024-06-20 RX ORDER — SULFAMETHOXAZOLE AND TRIMETHOPRIM 800; 160 MG/1; MG/1
1 TABLET ORAL 2 TIMES DAILY
Qty: 6 TABLET | Refills: 0 | Status: SHIPPED | OUTPATIENT
Start: 2024-06-20 | End: 2024-06-23

## 2024-08-23 ENCOUNTER — TELEPHONE (OUTPATIENT)
Dept: ORTHOPEDIC SURGERY | Facility: CLINIC | Age: 69
End: 2024-08-23
Payer: MEDICARE

## 2024-09-16 ENCOUNTER — HOSPITAL ENCOUNTER (OUTPATIENT)
Dept: RADIOLOGY | Facility: EXTERNAL LOCATION | Age: 69
Discharge: HOME | End: 2024-09-16

## 2024-09-16 ENCOUNTER — APPOINTMENT (OUTPATIENT)
Dept: ORTHOPEDIC SURGERY | Facility: CLINIC | Age: 69
End: 2024-09-16

## 2024-09-16 VITALS — BODY MASS INDEX: 27.49 KG/M2 | WEIGHT: 165 LBS | HEIGHT: 65 IN

## 2024-09-16 DIAGNOSIS — M16.11 ARTHRITIS OF RIGHT HIP: Primary | ICD-10-CM

## 2024-09-16 PROCEDURE — 1036F TOBACCO NON-USER: CPT | Performed by: FAMILY MEDICINE

## 2024-09-16 PROCEDURE — 3008F BODY MASS INDEX DOCD: CPT | Performed by: FAMILY MEDICINE

## 2024-09-16 PROCEDURE — 1159F MED LIST DOCD IN RCRD: CPT | Performed by: FAMILY MEDICINE

## 2024-09-16 PROCEDURE — 0232T NJX PLATELET PLASMA: CPT | Performed by: FAMILY MEDICINE

## 2024-09-16 PROCEDURE — 1160F RVW MEDS BY RX/DR IN RCRD: CPT | Performed by: FAMILY MEDICINE

## 2024-09-16 RX ORDER — TRAMADOL HYDROCHLORIDE 50 MG/1
50 TABLET ORAL EVERY 8 HOURS PRN
Qty: 15 TABLET | Refills: 0 | Status: SHIPPED | OUTPATIENT
Start: 2024-09-16 | End: 2024-09-21

## 2024-09-16 NOTE — PROGRESS NOTES
History of Present Illness   Chief Complaint   Patient presents with    Right Hip - Follow-up     Prp injection       The patient is 68 y.o. female  here for right hip PRP injection for treatment of underlying osteoarthritis.  Patient has arthritis of right hip, MRI showed more advanced arthritis compared to x-ray findings.  She has had 2 prior PRP injections with good improvement, last injection was around 4 months ago with more recent recurrence of pain.  She says overall symptoms are not too bad but she is going on to vacations next month and is hopeful to have less pain for these trips.  Pain over the anterior hip and groin.  She has had prior corticosteroid injection that provided more short-term relief.  She has not contemplated hip replacement surgery.          Past Medical History:   Diagnosis Date    Acute frontal sinusitis, unspecified 05/22/2020    Sinusitis, acute frontal    Acute maxillary sinusitis, unspecified 05/22/2020    Sinusitis, acute maxillary    Dysfunction of left eustachian tube 08/21/2023    Generalized anxiety disorder 01/10/2020    Anxiety, generalized    Nervousness 01/10/2020    Nervously anxious    Personal history of other endocrine, nutritional and metabolic disease 05/03/2021    History of hyperlipidemia    Personal history of other infectious and parasitic diseases 02/09/2018    History of herpes zoster    Personal history of other medical treatment 04/13/2021    History of screening mammography       Medication Documentation Review Audit       Reviewed by Myah Lynn MA (Technologist) on 09/16/24 at 1014      Medication Order Taking? Sig Documenting Provider Last Dose Status   ALPRAZolam (Xanax) 0.5 mg tablet [id number] No Take 1 tablet (0.5 mg) by mouth once daily as needed for anxiety. Historical Provider, MD Taking Active   amLODIPine-benazepriL (Lotrel) 5-10 mg capsule [id number] No TAKE 1 CAPSULE DAILY Corry Turk MD Taking Active   atorvastatin (Lipitor) 20 mg tablet  [id number] No TAKE 1 TABLET AT BEDTIME Corry Turk MD Taking Active   calcium carbonate (CALCIUM 600 ORAL) [id number] No Take by mouth. Historical Provider, MD Taking Active   diclofenac (Voltaren) 75 mg EC tablet [id number] No Take 1 tablet (75 mg) by mouth 2 times a day as needed (pain). Do not crush, chew, or split.   Patient not taking: Reported on 1/11/2024    Corry Turk MD Not Taking Active   doxylamine succinate (UNISOM, DOXYLAMINE, ORAL) [id number] No Take 0.25 tablets by mouth once daily at bedtime. Historical Provider, MD Taking Active   estrogens, conjugated, (Premarin) vaginal cream [id number] No Apply a pea-sized amount to vaginal opening 2 times per week Historical Provider, MD Not Taking Active   fenofibrate (Tricor) 145 mg tablet [id number] No Take 1 tablet (145 mg) by mouth once daily. Historical Provider, MD Not Taking Active   hydrOXYzine HCL (Atarax) 25 mg tablet [id number] No Take 1 tablet (25 mg) by mouth once daily as needed for anxiety. Corry Turk MD Taking Active   LORazepam (Ativan) 1 mg tablet [id number] No Take 1 tablet (1 mg) by mouth see administration instructions for 1 dose. Take one tablet by mouth 1 hour prior to MRI   Patient not taking: Reported on 1/11/2024    Anurag Hicks MD Not Taking Active   meclizine (Antivert) 25 mg tablet [id number] No  Historical Provider, MD Taking Active   meloxicam (Mobic) 7.5 mg tablet [id number] No Take 1 tablet (7.5 mg) by mouth once daily. Corry Turk MD Taking Active   methocarbamol (Robaxin) 500 mg tablet [id number] No Take 1 tablet (500 mg) by mouth 3 times a day. Corry Turk MD Taking Active   methylPREDNISolone (Medrol Dospak) 4 mg tablets [id number]  Use as directed by package instructions Anurag Hicks MD  Active   PARoxetine (Paxil) 10 mg tablet [id number] No TAKE 1 TABLET DAILY AS     DIRECTED Corry Turk MD Taking Active   VALERIAN ROOT ORAL [id number] No Take by mouth as needed at bedtime (anxiety). Historical Provider,  MD Not Taking Active   ZINC ORAL [id number] No Take by mouth. Historical Provider, MD Not Taking Active                    Allergies   Allergen Reactions    Aspirin Other and Unknown     bruising    Increase bruising per pt    bruising    Increase bruising per pt       Social History     Socioeconomic History    Marital status: Unknown     Spouse name: Not on file    Number of children: Not on file    Years of education: Not on file    Highest education level: Not on file   Occupational History    Not on file   Tobacco Use    Smoking status: Never    Smokeless tobacco: Never   Substance and Sexual Activity    Alcohol use: Yes     Comment: OCCASIONAL    Drug use: Never    Sexual activity: Not on file   Other Topics Concern    Not on file   Social History Narrative    Not on file     Social Determinants of Health     Financial Resource Strain: Not on file   Food Insecurity: No Food Insecurity (1/11/2024)    Hunger Vital Sign     Worried About Running Out of Food in the Last Year: Never true     Ran Out of Food in the Last Year: Never true   Transportation Needs: Not on file   Physical Activity: Not on file   Stress: Not on file   Social Connections: Not on file   Intimate Partner Violence: Not on file   Housing Stability: Not on file       Past Surgical History:   Procedure Laterality Date    DILATION AND CURETTAGE OF UTERUS  02/09/2018    Dilation And Curettage    OTHER SURGICAL HISTORY  11/20/2020    Dilation and curettage          Review of Systems   GENERAL: Negative  GI: Negative  MUSCULOSKELETAL: See HPI  SKIN: Negative  NEURO:  Negative     Physical Exam:    General/Constitutional: well appearing, no distress, appears stated age  HEENT: sclera clear  Respiratory: non labored breathing  Vascular: No edema, swelling or tenderness, except as noted in detailed exam.  Integumentary: No impressive skin lesions present, except as noted in detailed exam.  Neurological:  Alert and oriented   Psychological:  Normal mood  and affect.  Musculoskeletal: Normal, except as noted in detailed exam and in HPI    Right hip: Normal appearance, no overlying skin changes, no redness or warmth.  She has slight decreased range of motion with flexion and internal rotation with exacerbation of right hip pain.  Mild discomfort with Stinchfield test.    PROCEDURE    Right hip joint leukocyte poor PRP injection under ultrasound guidance    Consent:  After the risks and benefits of the procedure were explained, consent was given by the patient to proceed.     Procedure:    52 cc of peripheral blood with extracted from the patient at the left AC fossa. This was mixed with 8cc of anticoagulant. The blood the then centrifuged using the [employer] system to obtain ~ 3cc of leukocyte poor PRP     The right hip joint and surrounding structures was then visualized under ultrasound guidance. The injection site was prepped and cleaned with Betadine solution.     Under ultrasound guidance the right hip joint was injected with PRP solution obtained from patient as above.  During injection, there was unrestricted flow and care was taken not to inject corticosteroid into the skin or subcutaneous tissues.     A sterile band-aide was applied.  Post-injection instructions were given regarding post-procedure care, when to follow up in clinic and what to expect from the procedure.  The patient tolerated the injection well and was discharged without complication.      Assessment   1. Arthritis of right hip                Plan: Successful ultrasound-guided right hip joint injection with leukocyte poor PRP for the treatment of underlying osteoarthritis. Patient tolerated procedure without issue, I did recommend that she refrain from NSAIDs for the next 1 week, she was given a prescription for tramadol to take for as needed breakthrough pain as she may have some slightly worsening pain for the next few days.  She will plan to follow-up as symptoms dictate

## 2024-11-07 DIAGNOSIS — I10 HYPERTENSION: ICD-10-CM

## 2024-11-07 RX ORDER — PAROXETINE 10 MG/1
10 TABLET, FILM COATED ORAL DAILY
Qty: 30 TABLET | Refills: 0 | Status: SHIPPED | OUTPATIENT
Start: 2024-11-07

## 2024-11-07 RX ORDER — AMLODIPINE AND BENAZEPRIL HYDROCHLORIDE 5; 10 MG/1; MG/1
1 CAPSULE ORAL DAILY
Qty: 30 CAPSULE | Refills: 0 | Status: SHIPPED | OUTPATIENT
Start: 2024-11-07

## 2024-11-07 RX ORDER — ATORVASTATIN CALCIUM 20 MG/1
20 TABLET, FILM COATED ORAL NIGHTLY
Qty: 30 TABLET | Refills: 0 | Status: SHIPPED | OUTPATIENT
Start: 2024-11-07

## 2024-12-17 ENCOUNTER — APPOINTMENT (OUTPATIENT)
Dept: PRIMARY CARE | Facility: CLINIC | Age: 69
End: 2024-12-17
Payer: MEDICARE

## 2024-12-17 VITALS
WEIGHT: 175 LBS | HEIGHT: 65 IN | DIASTOLIC BLOOD PRESSURE: 80 MMHG | BODY MASS INDEX: 29.16 KG/M2 | SYSTOLIC BLOOD PRESSURE: 130 MMHG | HEART RATE: 112 BPM

## 2024-12-17 DIAGNOSIS — Z11.59 NEED FOR HEPATITIS C SCREENING TEST: ICD-10-CM

## 2024-12-17 DIAGNOSIS — Z00.00 MEDICARE ANNUAL WELLNESS VISIT, SUBSEQUENT: Primary | ICD-10-CM

## 2024-12-17 DIAGNOSIS — E78.2 MIXED HYPERLIPIDEMIA: ICD-10-CM

## 2024-12-17 DIAGNOSIS — I10 PRIMARY HYPERTENSION: ICD-10-CM

## 2024-12-17 DIAGNOSIS — Z12.31 ENCOUNTER FOR SCREENING MAMMOGRAM FOR BREAST CANCER: ICD-10-CM

## 2024-12-17 DIAGNOSIS — Z78.0 POSTMENOPAUSAL STATE: ICD-10-CM

## 2024-12-17 DIAGNOSIS — F41.1 GAD (GENERALIZED ANXIETY DISORDER): ICD-10-CM

## 2024-12-17 DIAGNOSIS — R73.03 PREDIABETES: ICD-10-CM

## 2024-12-17 PROBLEM — R73.9 HYPERGLYCEMIA: Status: RESOLVED | Noted: 2024-12-17 | Resolved: 2024-12-17

## 2024-12-17 PROBLEM — Z86.19 HISTORY OF HERPES ZOSTER: Status: RESOLVED | Noted: 2024-12-17 | Resolved: 2024-12-17

## 2024-12-17 PROCEDURE — 1036F TOBACCO NON-USER: CPT | Performed by: FAMILY MEDICINE

## 2024-12-17 PROCEDURE — G0439 PPPS, SUBSEQ VISIT: HCPCS | Performed by: FAMILY MEDICINE

## 2024-12-17 PROCEDURE — 3079F DIAST BP 80-89 MM HG: CPT | Performed by: FAMILY MEDICINE

## 2024-12-17 PROCEDURE — 99213 OFFICE O/P EST LOW 20 MIN: CPT | Performed by: FAMILY MEDICINE

## 2024-12-17 PROCEDURE — 1123F ACP DISCUSS/DSCN MKR DOCD: CPT | Performed by: FAMILY MEDICINE

## 2024-12-17 PROCEDURE — 1159F MED LIST DOCD IN RCRD: CPT | Performed by: FAMILY MEDICINE

## 2024-12-17 PROCEDURE — 3075F SYST BP GE 130 - 139MM HG: CPT | Performed by: FAMILY MEDICINE

## 2024-12-17 PROCEDURE — 1170F FXNL STATUS ASSESSED: CPT | Performed by: FAMILY MEDICINE

## 2024-12-17 PROCEDURE — 1158F ADVNC CARE PLAN TLK DOCD: CPT | Performed by: FAMILY MEDICINE

## 2024-12-17 PROCEDURE — 1160F RVW MEDS BY RX/DR IN RCRD: CPT | Performed by: FAMILY MEDICINE

## 2024-12-17 PROCEDURE — 3008F BODY MASS INDEX DOCD: CPT | Performed by: FAMILY MEDICINE

## 2024-12-17 RX ORDER — PAROXETINE 10 MG/1
10 TABLET, FILM COATED ORAL DAILY
Qty: 90 TABLET | Refills: 3 | Status: SHIPPED | OUTPATIENT
Start: 2024-12-17 | End: 2025-01-03

## 2024-12-17 RX ORDER — HYDROXYZINE HYDROCHLORIDE 25 MG/1
25 TABLET, FILM COATED ORAL DAILY PRN
Qty: 90 TABLET | Refills: 3 | Status: SHIPPED | OUTPATIENT
Start: 2024-12-17

## 2024-12-17 RX ORDER — ATORVASTATIN CALCIUM 20 MG/1
20 TABLET, FILM COATED ORAL NIGHTLY
Qty: 90 TABLET | Refills: 3 | Status: SHIPPED | OUTPATIENT
Start: 2024-12-17 | End: 2025-01-03

## 2024-12-17 RX ORDER — AMLODIPINE AND BENAZEPRIL HYDROCHLORIDE 5; 10 MG/1; MG/1
1 CAPSULE ORAL DAILY
Qty: 90 CAPSULE | Refills: 3 | Status: SHIPPED | OUTPATIENT
Start: 2024-12-17 | End: 2025-01-03

## 2024-12-17 ASSESSMENT — ACTIVITIES OF DAILY LIVING (ADL)
TAKING_MEDICATION: INDEPENDENT
DOING_HOUSEWORK: INDEPENDENT
DRESSING: INDEPENDENT
GROCERY_SHOPPING: INDEPENDENT
BATHING: INDEPENDENT
MANAGING_FINANCES: INDEPENDENT

## 2024-12-17 ASSESSMENT — PATIENT HEALTH QUESTIONNAIRE - PHQ9
2. FEELING DOWN, DEPRESSED OR HOPELESS: NOT AT ALL
1. LITTLE INTEREST OR PLEASURE IN DOING THINGS: NOT AT ALL
SUM OF ALL RESPONSES TO PHQ9 QUESTIONS 1 AND 2: 0

## 2024-12-17 NOTE — PROGRESS NOTES
"Subjective   Reason for Visit: Bobbi Oliveira is an 69 y.o. female here for a Medicare Wellness visit.               HPI    Patient Care Team:  Corry Turk MD as PCP - General  Corry Turk MD as PCP - [facility] Attributed Provider     Review of Systems    Objective   Vitals:  /82   Pulse (!) 112   Ht 1.638 m (5' 4.5\")   Wt 79.4 kg (175 lb)   BMI 29.57 kg/m²       Physical Exam    Assessment & Plan              "

## 2024-12-17 NOTE — PROGRESS NOTES
Subjective   Reason for Visit: Bobbi Oliveira is an 69 y.o. female here for a Medicare Wellness visit.     Past Medical, Surgical, and Family History reviewed and updated in chart.    Reviewed all medications by prescribing practitioner or clinical pharmacist (such as prescriptions, OTCs, herbal therapies and supplements) and documented in the medical record.    Annual Medicare wellness exam and follow-up chronic conditions   hypertension hyperlipidemia anxiety    Hypertension    Hyperlipidemia        Patient Self Assessment of Health Status  Patient Self Assessment: Good    Nutrition and Exercise  Current Diet: Well Balanced Diet  Adequate Fluid Intake: Yes  Caffeine: Yes  Exercise Frequency: Regularly    Functional Ability/Level of Safety  Cognitive Impairment Observed: No cognitive impairment observed  Cognitive Impairment Reported: No cognitive impairment reported by patient or family    Home Safety Risk Factors: None         Patient Care Team:  Corry Turk MD as PCP - General  Corry Turk MD as PCP - [facility]     Review of Systems  Constitutional: no chills, no fever and no night sweats.   Eyes: no blurred vision and no eyesight problems.   ENT: no hearing loss, no nasal congestion, no nasal discharge, no hoarseness and no sore throat.   Cardiovascular: no chest pain, no intermittent leg claudication, no lower extremity edema, no palpitations and no syncope.   Respiratory: no cough, no shortness of breath during exertion, no shortness of breath at rest and no wheezing.   Gastrointestinal: no abdominal pain, no blood in stools, no constipation, no diarrhea, no melena, no nausea, no rectal pain and no vomiting.   Genitourinary: no dysuria, no change in urinary frequency, no urinary hesitancy, no feelings of urinary urgency and no vaginal discharge.   Musculoskeletal: no arthralgias,  no back pain and no myalgias.   Integumentary: no new skin lesions and no rashes.  "  Neurological: no difficulty walking, no headache, no limb weakness, no numbness and no tingling.   Psychiatric: no anxiety, no depression, no anhedonia and no substance use disorders.   Endocrine: no recent weight gain and no recent weight loss.   Hematologic/Lymphatic: no tendency for easy bruising and no swollen glands .    Medicare Wellness Billing Compliance Satisfied    *This is a visual tool to show completion of required items on the day of the visit. Green checks will only appear on the date of visit.      Objective   Vitals:  /80   Pulse (!) 112   Ht 1.638 m (5' 4.5\")   Wt 79.4 kg (175 lb)   BMI 29.57 kg/m²       Physical Exam  The patient appeared well nourished and normally developed. Vital signs as documented. Head exam is unremarkable. No scleral icterus or corneal arcus noted.  Pupils are equal round reactive to light extraocular movements are intact no hemorrhages noted on funduscopic exam mouth mucous membranes are moist no exudates ears canals clear TMs are gray pearly not injected nose no rhinorrhea or epistaxis Neck is without jugular venous distension, thyromegaly, or carotid bruits. Carotid upstrokes are brisk bilaterally. Lungs are clear to auscultation and percussion. Cardiac exam reveals the PMI to be normally sized and situated. Rhythm is regular. First and second heart sounds normal. No murmurs, rubs or gallops. Abdominal exam reveals normal bowel sounds, no masses, no organomegaly and no aortic enlargement. Extremities are nonedematous and both femoral and pedal pulses are normal.  Neurologic exam DTRs are equal bilaterally no focal deficits strength is symmetrical heme lymph no palpable lymph nodes in the neck axilla or groin    Assessment/Plan   Problem List Items Addressed This Visit       Primary hypertension    Relevant Medications    amLODIPine-benazepriL (Lotrel) 5-10 mg capsule    Other Relevant Orders    Comprehensive metabolic panel    Mixed hyperlipidemia    Relevant " Medications    atorvastatin (Lipitor) 20 mg tablet    Other Relevant Orders    Lipid panel    ALEC (generalized anxiety disorder)    Relevant Medications    PARoxetine (Paxil) 10 mg tablet    hydrOXYzine HCL (Atarax) 25 mg tablet    Prediabetes    Relevant Orders    Hemoglobin A1C    Medicare annual wellness visit, subsequent - Primary     Other Visit Diagnoses       Need for hepatitis C screening test        Relevant Orders    Hepatitis C antibody    Postmenopausal state        Relevant Orders    XR DEXA bone density    Encounter for screening mammogram for breast cancer        Relevant Orders    BI mammo bilateral screening tomosynthesis

## 2024-12-31 ASSESSMENT — ACTIVITIES OF DAILY LIVING (ADL)
DRESSING: INDEPENDENT
TAKING_MEDICATION: INDEPENDENT
MANAGING_FINANCES: INDEPENDENT
BATHING: INDEPENDENT
GROCERY_SHOPPING: INDEPENDENT
DOING_HOUSEWORK: INDEPENDENT

## 2024-12-31 ASSESSMENT — ENCOUNTER SYMPTOMS: HYPERTENSION: 1

## 2025-01-02 DIAGNOSIS — E78.2 MIXED HYPERLIPIDEMIA: ICD-10-CM

## 2025-01-02 DIAGNOSIS — F41.1 GAD (GENERALIZED ANXIETY DISORDER): ICD-10-CM

## 2025-01-02 DIAGNOSIS — I10 PRIMARY HYPERTENSION: ICD-10-CM

## 2025-01-03 RX ORDER — ATORVASTATIN CALCIUM 20 MG/1
20 TABLET, FILM COATED ORAL DAILY
Qty: 90 TABLET | Refills: 3 | Status: SHIPPED | OUTPATIENT
Start: 2025-01-03

## 2025-01-03 RX ORDER — PAROXETINE 10 MG/1
10 TABLET, FILM COATED ORAL EVERY MORNING
Qty: 90 TABLET | Refills: 3 | Status: SHIPPED | OUTPATIENT
Start: 2025-01-03

## 2025-01-03 RX ORDER — AMLODIPINE AND BENAZEPRIL HYDROCHLORIDE 5; 10 MG/1; MG/1
1 CAPSULE ORAL DAILY
Qty: 90 CAPSULE | Refills: 3 | Status: SHIPPED | OUTPATIENT
Start: 2025-01-03

## 2025-01-27 ENCOUNTER — HOSPITAL ENCOUNTER (OUTPATIENT)
Dept: RADIOLOGY | Facility: CLINIC | Age: 70
Discharge: HOME | End: 2025-01-27
Payer: MEDICARE

## 2025-01-27 DIAGNOSIS — Z12.31 ENCOUNTER FOR SCREENING MAMMOGRAM FOR BREAST CANCER: ICD-10-CM

## 2025-01-27 DIAGNOSIS — Z78.0 POSTMENOPAUSAL STATE: ICD-10-CM

## 2025-01-27 PROCEDURE — 77067 SCR MAMMO BI INCL CAD: CPT

## 2025-01-27 PROCEDURE — 77067 SCR MAMMO BI INCL CAD: CPT | Performed by: RADIOLOGY

## 2025-01-27 PROCEDURE — 77063 BREAST TOMOSYNTHESIS BI: CPT | Performed by: RADIOLOGY

## 2025-01-27 PROCEDURE — 77080 DXA BONE DENSITY AXIAL: CPT

## 2025-01-27 PROCEDURE — 77080 DXA BONE DENSITY AXIAL: CPT | Performed by: RADIOLOGY

## 2025-01-28 DIAGNOSIS — E55.9 VITAMIN D DEFICIENCY: Primary | ICD-10-CM

## 2025-02-04 LAB — 25(OH)D3+25(OH)D2 SERPL-MCNC: 29 NG/ML (ref 30–100)

## 2025-02-05 LAB
ALBUMIN SERPL-MCNC: 4.6 G/DL (ref 3.6–5.1)
ALP SERPL-CCNC: 78 U/L (ref 37–153)
ALT SERPL-CCNC: 17 U/L (ref 6–29)
ANION GAP SERPL CALCULATED.4IONS-SCNC: 9 MMOL/L (CALC) (ref 7–17)
AST SERPL-CCNC: 19 U/L (ref 10–35)
BILIRUB SERPL-MCNC: 0.5 MG/DL (ref 0.2–1.2)
BUN SERPL-MCNC: 11 MG/DL (ref 7–25)
CALCIUM SERPL-MCNC: 9.5 MG/DL (ref 8.6–10.4)
CHLORIDE SERPL-SCNC: 106 MMOL/L (ref 98–110)
CHOLEST SERPL-MCNC: 197 MG/DL
CHOLEST/HDLC SERPL: 3.8 (CALC)
CO2 SERPL-SCNC: 25 MMOL/L (ref 20–32)
CREAT SERPL-MCNC: 0.7 MG/DL (ref 0.5–1.05)
EGFRCR SERPLBLD CKD-EPI 2021: 94 ML/MIN/1.73M2
EST. AVERAGE GLUCOSE BLD GHB EST-MCNC: 126 MG/DL
EST. AVERAGE GLUCOSE BLD GHB EST-SCNC: 7 MMOL/L
GLUCOSE SERPL-MCNC: 102 MG/DL (ref 65–99)
HBA1C MFR BLD: 6 % OF TOTAL HGB
HCV AB SERPL QL IA: NORMAL
HDLC SERPL-MCNC: 52 MG/DL
LDLC SERPL CALC-MCNC: 106 MG/DL (CALC)
NONHDLC SERPL-MCNC: 145 MG/DL (CALC)
POTASSIUM SERPL-SCNC: 4.1 MMOL/L (ref 3.5–5.3)
PROT SERPL-MCNC: 7.3 G/DL (ref 6.1–8.1)
SODIUM SERPL-SCNC: 140 MMOL/L (ref 135–146)
TRIGL SERPL-MCNC: 295 MG/DL

## 2025-04-08 DIAGNOSIS — F41.1 GAD (GENERALIZED ANXIETY DISORDER): ICD-10-CM

## 2025-04-09 RX ORDER — HYDROXYZINE HYDROCHLORIDE 25 MG/1
25 TABLET, FILM COATED ORAL DAILY PRN
Qty: 90 TABLET | Refills: 3 | Status: SHIPPED | OUTPATIENT
Start: 2025-04-09

## 2025-06-23 ENCOUNTER — APPOINTMENT (OUTPATIENT)
Dept: ORTHOPEDIC SURGERY | Facility: CLINIC | Age: 70
End: 2025-06-23

## 2025-06-23 ENCOUNTER — HOSPITAL ENCOUNTER (OUTPATIENT)
Dept: RADIOLOGY | Facility: EXTERNAL LOCATION | Age: 70
Discharge: HOME | End: 2025-06-23

## 2025-06-23 VITALS — HEIGHT: 65 IN | BODY MASS INDEX: 26.66 KG/M2 | WEIGHT: 160 LBS

## 2025-06-23 DIAGNOSIS — M16.11 ARTHRITIS OF RIGHT HIP: Primary | ICD-10-CM

## 2025-06-23 DIAGNOSIS — M25.551 PAIN OF RIGHT HIP: ICD-10-CM

## 2025-06-23 PROCEDURE — 1159F MED LIST DOCD IN RCRD: CPT | Performed by: FAMILY MEDICINE

## 2025-06-23 PROCEDURE — 3008F BODY MASS INDEX DOCD: CPT | Performed by: FAMILY MEDICINE

## 2025-06-23 PROCEDURE — 1160F RVW MEDS BY RX/DR IN RCRD: CPT | Performed by: FAMILY MEDICINE

## 2025-06-23 PROCEDURE — 0232T NJX PLATELET PLASMA: CPT | Performed by: FAMILY MEDICINE

## 2025-06-23 PROCEDURE — 1123F ACP DISCUSS/DSCN MKR DOCD: CPT | Performed by: FAMILY MEDICINE

## 2025-06-23 PROCEDURE — 1036F TOBACCO NON-USER: CPT | Performed by: FAMILY MEDICINE

## 2025-06-23 RX ORDER — TRAMADOL HYDROCHLORIDE 50 MG/1
50 TABLET, FILM COATED ORAL EVERY 8 HOURS PRN
Qty: 15 TABLET | Refills: 0 | Status: SHIPPED | OUTPATIENT
Start: 2025-06-23 | End: 2025-06-28

## 2025-06-23 NOTE — PROGRESS NOTES
History of Present Illness   Chief Complaint   Patient presents with    Right Hip - Injections     PRP       The patient is 69 y.o. female  here for right hip PRP injection for treatment of underlying osteoarthritis.  Patient has arthritis of right hip, MRI showed more advanced arthritis compared to x-ray findings.  She has had 3 prior PRP injections with good improvement, last injection was around 9 months ago with more recent recurrence of pain.  Worsening pain while recently on vacation, says she was getting up from beach chair when she felt some pain in her hip rating down her thigh with some pain into the shin as well.  Symptoms have improved some but still present, thought she may benefit from repeat PRP injection today.  She has been off NSAIDs for the past 2 weeks, no other contraindication to PRP.        Past Medical History:   Diagnosis Date    Acute frontal sinusitis, unspecified 05/22/2020    Sinusitis, acute frontal    Acute maxillary sinusitis, unspecified 05/22/2020    Sinusitis, acute maxillary    Dysfunction of left eustachian tube 08/21/2023    Generalized anxiety disorder 01/10/2020    Anxiety, generalized    History of herpes zoster 12/17/2024    Hyperglycemia 12/17/2024    Nervousness 01/10/2020    Nervously anxious    Personal history of other endocrine, nutritional and metabolic disease 05/03/2021    History of hyperlipidemia    Personal history of other infectious and parasitic diseases 02/09/2018    History of herpes zoster    Personal history of other medical treatment 04/13/2021    History of screening mammography       Medication Documentation Review Audit       Reviewed by Corry Turk MD (Physician) on 12/31/24 at 2138      Medication Order Taking? Sig Documenting Provider Last Dose Status   amLODIPine-benazepriL (Lotrel) 5-10 mg capsule [id number]  Take 1 capsule by mouth once daily. Corry Turk MD  Active   atorvastatin (Lipitor) 20 mg tablet [id number]  Take 1 tablet (20 mg)  by mouth once daily at bedtime. Corry Turk MD  Active   calcium carbonate (CALCIUM 600 ORAL) [id number] Yes Take by mouth. Historical Provider, MD Taking Active   doxylamine succinate (UNISOM, DOXYLAMINE, ORAL) [id number] Yes Take 0.25 tablets by mouth once daily at bedtime. Historical Provider, MD Taking Active   hydrOXYzine HCL (Atarax) 25 mg tablet [id number]  Take 1 tablet (25 mg) by mouth once daily as needed for anxiety. Corry Turk MD  Active   meclizine (Antivert) 25 mg tablet [id number] Yes  Historical Provider, MD Taking Active   methocarbamol (Robaxin) 500 mg tablet [id number] Yes Take 1 tablet (500 mg) by mouth 3 times a day. Corry Turk MD Taking Active   PARoxetine (Paxil) 10 mg tablet [id number]  Take 1 tablet (10 mg) by mouth once daily. as directed Corry Turk MD  Active                    Allergies   Allergen Reactions    Aspirin Other and Unknown     bruising    Increase bruising per pt    bruising    Increase bruising per pt       Social History     Socioeconomic History    Marital status: Unknown     Spouse name: Not on file    Number of children: Not on file    Years of education: Not on file    Highest education level: Not on file   Occupational History    Not on file   Tobacco Use    Smoking status: Never    Smokeless tobacco: Never   Substance and Sexual Activity    Alcohol use: Yes     Comment: OCCASIONAL    Drug use: Never    Sexual activity: Not on file   Other Topics Concern    Not on file   Social History Narrative    Not on file     Social Drivers of Health     Financial Resource Strain: Not on file   Food Insecurity: No Food Insecurity (1/11/2024)    Hunger Vital Sign     Worried About Running Out of Food in the Last Year: Never true     Ran Out of Food in the Last Year: Never true   Transportation Needs: Not on file   Physical Activity: Not on file   Stress: Not on file   Social Connections: Not on file   Intimate Partner Violence: Not on file   Housing Stability: Not  on file       Past Surgical History:   Procedure Laterality Date    DILATION AND CURETTAGE OF UTERUS  02/09/2018    Dilation And Curettage    OTHER SURGICAL HISTORY  11/20/2020    Dilation and curettage          Review of Systems   GENERAL: Negative  GI: Negative  MUSCULOSKELETAL: See HPI  SKIN: Negative  NEURO:  Negative     Physical Exam:    General/Constitutional: well appearing, no distress, appears stated age  HEENT: sclera clear  Respiratory: non labored breathing  Vascular: No edema, swelling or tenderness, except as noted in detailed exam.  Integumentary: No impressive skin lesions present, except as noted in detailed exam.  Neurological:  Alert and oriented   Psychological:  Normal mood and affect.  Musculoskeletal: Normal, except as noted in detailed exam and in HPI    Right hip: Normal appearance, no overlying skin changes, no redness or warmth.  She has slight decreased range of motion with flexion and internal rotation with exacerbation of right hip pain.  Mild discomfort with Stinchfield test.    Negative straight leg raise test, no lumbar spine tenderness to palpation.    PROCEDURE    Right hip joint leukocyte poor PRP injection under ultrasound guidance    Consent:  After the risks and benefits of the procedure were explained, consent was given by the patient to proceed.     Procedure:    52 cc of peripheral blood with extracted from the patient at the left AC fossa. This was mixed with 8cc of anticoagulant. The blood the then centrifuged using the [employer] system to obtain ~3 cc of leukocyte poor PRP     The right hip joint and surrounding structures was then visualized under ultrasound guidance. The injection site was prepped and cleaned with Betadine solution.     Under ultrasound guidance the right hip joint was injected with PRP solution obtained from patient as above.  During injection, there was unrestricted flow and care was taken not to inject corticosteroid into the skin or  subcutaneous tissues.     A sterile band-aide was applied.  Post-injection instructions were given regarding post-procedure care, when to follow up in clinic and what to expect from the procedure.  The patient tolerated the injection well and was discharged without complication.      Assessment   1. Arthritis of right hip        2. Pain of right hip  Point of Care Ultrasound              Plan: Successful ultrasound-guided right hip joint injection with leukocyte poor PRP for the treatment of underlying osteoarthritis. Patient tolerated procedure without issue, I did recommend that she refrain from NSAIDs for the next 1 week, she was given a prescription for tramadol to take for as needed breakthrough pain as she may have some slightly worsening pain for the next few days.  We discussed that the pain further down her leg into her shin may be secondary to some sciatic symptoms, she will monitor for any change in the symptoms following injection, anticipate injection should be helpful for her hip and groin pain.  She may benefit from some lumbar spine physical therapy if lingering lower leg pain.  She will plan to follow-up as symptoms dictate

## 2026-01-05 ENCOUNTER — APPOINTMENT (OUTPATIENT)
Dept: PRIMARY CARE | Facility: CLINIC | Age: 71
End: 2026-01-05
Payer: MEDICARE